# Patient Record
Sex: FEMALE | Race: BLACK OR AFRICAN AMERICAN | NOT HISPANIC OR LATINO | URBAN - METROPOLITAN AREA
[De-identification: names, ages, dates, MRNs, and addresses within clinical notes are randomized per-mention and may not be internally consistent; named-entity substitution may affect disease eponyms.]

---

## 2017-11-16 ENCOUNTER — INPATIENT (INPATIENT)
Facility: HOSPITAL | Age: 77
LOS: 2 days | Discharge: HOME | End: 2017-11-19
Attending: SURGERY | Admitting: SURGERY
Payer: MEDICARE

## 2017-11-16 VITALS
DIASTOLIC BLOOD PRESSURE: 108 MMHG | HEART RATE: 108 BPM | OXYGEN SATURATION: 100 % | TEMPERATURE: 99 F | RESPIRATION RATE: 18 BRPM | SYSTOLIC BLOOD PRESSURE: 170 MMHG

## 2017-11-16 LAB
BASE EXCESS BLDV CALC-SCNC: 4.4 MMOL/L — SIGNIFICANT CHANGE UP
BLOOD GAS VENOUS - CREATININE: 0.63 MG/DL — SIGNIFICANT CHANGE UP (ref 0.5–1.3)
CHLORIDE BLDV-SCNC: 106 MMOL/L — SIGNIFICANT CHANGE UP (ref 96–108)
GAS PNL BLDV: 135 MMOL/L — LOW (ref 136–146)
GLUCOSE BLDV-MCNC: 121 — HIGH (ref 70–99)
HCO3 BLDV-SCNC: 27 MMOL/L — SIGNIFICANT CHANGE UP (ref 20–27)
HCT VFR BLDV CALC: 44.6 % — SIGNIFICANT CHANGE UP (ref 34.5–45)
HGB BLDV-MCNC: 14.6 G/DL — SIGNIFICANT CHANGE UP (ref 11.5–15.5)
LACTATE BLDV-MCNC: 1.8 MMOL/L — SIGNIFICANT CHANGE UP (ref 0.5–2)
PCO2 BLDV: 47 MMHG — SIGNIFICANT CHANGE UP (ref 41–51)
PH BLDV: 7.4 PH — SIGNIFICANT CHANGE UP (ref 7.32–7.43)
PO2 BLDV: 35 MMHG — SIGNIFICANT CHANGE UP (ref 35–40)
POTASSIUM BLDV-SCNC: 4.7 MMOL/L — HIGH (ref 3.4–4.5)
SAO2 % BLDV: 64.4 % — SIGNIFICANT CHANGE UP (ref 60–85)

## 2017-11-16 NOTE — ED PROVIDER NOTE - PHYSICAL EXAMINATION
GENERAL: elderly female in no acute distress.   HEAD:  Atraumatic, Normocephalic  EYES: EOMI, PERRLA  NECK: Supple, No JVD  CHEST/LUNG: Clear to auscultation bilaterally; No wheeze  HEART: Regular rate and rhythm; No murmurs, rubs, or gallops  ABDOMEN: (+)BS, epigastric and RLQ at surgical site tenderness with deep palpation, no rebound or rigidity.   EXTREMITIES:  2+ Peripheral Pulses, No clubbing, cyanosis, or edema  PSYCH: AAOx3  NEUROLOGY: non-focal. 5/5 extremity and  strength.   SKIN: No rashes or lesions

## 2017-11-16 NOTE — ED PROVIDER NOTE - MEDICAL DECISION MAKING DETAILS
76 y/o F hx of HTN, diverticulitis s/p colectomy and reversal, GERD presents with atypical chest pain and abdominal pain. Labs, IVF, Trend CE. Initial EKG not worrisome for ACS. CT abd/pelvis for new pain at surgical site.

## 2017-11-16 NOTE — ED ADULT NURSE NOTE - CHIEF COMPLAINT QUOTE
Pt c/o abd pain with N/V since 1600. PMH of diverticulitis with hemorrhage, colectomy with reversal. LBM tonight at 2000. Denies diarrhea. Also c/o pain in the chest that she thinks is secondary to gas. Pt appears in NAD at this time. EKG in progress. No other PMH noted.

## 2017-11-16 NOTE — ED ADULT NURSE NOTE - CHPI ED SYMPTOMS NEG
no burning urination/no chills/no fever/no blood in stool/no hematuria/no dysuria/no abdominal distension/no diarrhea

## 2017-11-16 NOTE — ED PROVIDER NOTE - OBJECTIVE STATEMENT
76 y/o F hx of HTN, diverticulitis s/p colectomy and reversal, GERD presents with chest pain, N/V since 4 pm. Pt states started experiencing s 76 y/o F hx of HTN, diverticulitis s/p colectomy and reversal, GERD presents with chest pain, N/V since 4 pm. Pt states started experiencing sharp, non-radiating, non-exertional chest pain associated with N/V and subsequent epigastric discomfort. Denies any cardiac hx, has cardiologist for surgical clearance, denies hx of stress test or catheterization. Having normal BM, hx of GERD remote EGD. No recent hematochezia, melena, diarrhea, fever or chills.

## 2017-11-16 NOTE — ED PROVIDER NOTE - ATTENDING CONTRIBUTION TO CARE
78yo F PMH: HTN, diverticulitis s/p colectomy and reversal, GERD presents with epigastric abdominal pain, N/V since 4 pm. Patient currently denies any pain, no prior similar pain, has not had any pain since sx in 2013  On exam awake & alert, NAD., lungs CTAB no wheeze no crackle, RRR, abdomen soft RLQ tenderness no rebound no guarding, 2+ pulses b/l, neuro A&Ox3, skin warm and dry no rash

## 2017-11-16 NOTE — ED PROVIDER NOTE - PMH
Diverticulitis of colon with hemorrhage    GERD (gastroesophageal reflux disease)    HTN (hypertension)    Osteoporosis

## 2017-11-16 NOTE — ED ADULT NURSE NOTE - OBJECTIVE STATEMENT
Patient is a 77 years old creole speaking female who presents with complaints of epigastric /mid upper abdominal pain that started around 4:30 pm,  had a last meal at 0230 pm, accompanied with large amount of clear vomiting . Denies diarrhea, fever, chills. Patient has history of diverticulosis and as per family, has extensive GI surgeries. Patient is alert and oriented x 4, respirations are even and unlabored, S1, S2, regular, abdomen is soft and nontender. 20 gauge saline lock inserted on right basilic vein, blood drawn and sent. will follow up and monitor.

## 2017-11-17 DIAGNOSIS — K56.609 UNSPECIFIED INTESTINAL OBSTRUCTION, UNSPECIFIED AS TO PARTIAL VERSUS COMPLETE OBSTRUCTION: ICD-10-CM

## 2017-11-17 DIAGNOSIS — K43.5 PARASTOMAL HERNIA WITHOUT OBSTRUCTION OR GANGRENE: Chronic | ICD-10-CM

## 2017-11-17 LAB
ALBUMIN SERPL ELPH-MCNC: 4.5 G/DL — SIGNIFICANT CHANGE UP (ref 3.3–5)
ALP SERPL-CCNC: 100 U/L — SIGNIFICANT CHANGE UP (ref 40–120)
ALT FLD-CCNC: 13 U/L — SIGNIFICANT CHANGE UP (ref 4–33)
AST SERPL-CCNC: 20 U/L — SIGNIFICANT CHANGE UP (ref 4–32)
BASOPHILS # BLD AUTO: 0.03 K/UL — SIGNIFICANT CHANGE UP (ref 0–0.2)
BASOPHILS NFR BLD AUTO: 0.4 % — SIGNIFICANT CHANGE UP (ref 0–2)
BILIRUB SERPL-MCNC: 0.6 MG/DL — SIGNIFICANT CHANGE UP (ref 0.2–1.2)
BLD GP AB SCN SERPL QL: NEGATIVE — SIGNIFICANT CHANGE UP
BUN SERPL-MCNC: 11 MG/DL — SIGNIFICANT CHANGE UP (ref 7–23)
BUN SERPL-MCNC: 6 MG/DL — LOW (ref 7–23)
CALCIUM SERPL-MCNC: 9.2 MG/DL — SIGNIFICANT CHANGE UP (ref 8.4–10.5)
CALCIUM SERPL-MCNC: 9.7 MG/DL — SIGNIFICANT CHANGE UP (ref 8.4–10.5)
CHLORIDE SERPL-SCNC: 100 MMOL/L — SIGNIFICANT CHANGE UP (ref 98–107)
CHLORIDE SERPL-SCNC: 105 MMOL/L — SIGNIFICANT CHANGE UP (ref 98–107)
CK MB BLD-MCNC: 1.53 NG/ML — SIGNIFICANT CHANGE UP (ref 1–4.7)
CK MB BLD-MCNC: 3.41 NG/ML — SIGNIFICANT CHANGE UP (ref 1–4.7)
CK MB BLD-MCNC: SIGNIFICANT CHANGE UP (ref 0–2.5)
CK SERPL-CCNC: 111 U/L — SIGNIFICANT CHANGE UP (ref 25–170)
CK SERPL-CCNC: 41 U/L — SIGNIFICANT CHANGE UP (ref 25–170)
CO2 SERPL-SCNC: 23 MMOL/L — SIGNIFICANT CHANGE UP (ref 22–31)
CO2 SERPL-SCNC: 26 MMOL/L — SIGNIFICANT CHANGE UP (ref 22–31)
CREAT SERPL-MCNC: 0.65 MG/DL — SIGNIFICANT CHANGE UP (ref 0.5–1.3)
CREAT SERPL-MCNC: 0.73 MG/DL — SIGNIFICANT CHANGE UP (ref 0.5–1.3)
EOSINOPHIL # BLD AUTO: 0.02 K/UL — SIGNIFICANT CHANGE UP (ref 0–0.5)
EOSINOPHIL NFR BLD AUTO: 0.2 % — SIGNIFICANT CHANGE UP (ref 0–6)
GLUCOSE SERPL-MCNC: 117 MG/DL — HIGH (ref 70–99)
GLUCOSE SERPL-MCNC: 89 MG/DL — SIGNIFICANT CHANGE UP (ref 70–99)
HCT VFR BLD CALC: 40.5 % — SIGNIFICANT CHANGE UP (ref 34.5–45)
HCT VFR BLD CALC: 44.4 % — SIGNIFICANT CHANGE UP (ref 34.5–45)
HGB BLD-MCNC: 12.8 G/DL — SIGNIFICANT CHANGE UP (ref 11.5–15.5)
HGB BLD-MCNC: 14 G/DL — SIGNIFICANT CHANGE UP (ref 11.5–15.5)
IMM GRANULOCYTES # BLD AUTO: 0.03 # — SIGNIFICANT CHANGE UP
IMM GRANULOCYTES NFR BLD AUTO: 0.4 % — SIGNIFICANT CHANGE UP (ref 0–1.5)
LIDOCAIN IGE QN: 40.1 U/L — SIGNIFICANT CHANGE UP (ref 7–60)
LYMPHOCYTES # BLD AUTO: 0.94 K/UL — LOW (ref 1–3.3)
LYMPHOCYTES # BLD AUTO: 11.2 % — LOW (ref 13–44)
MAGNESIUM SERPL-MCNC: 1.9 MG/DL — SIGNIFICANT CHANGE UP (ref 1.6–2.6)
MAGNESIUM SERPL-MCNC: 2.1 MG/DL — SIGNIFICANT CHANGE UP (ref 1.6–2.6)
MCHC RBC-ENTMCNC: 25 PG — LOW (ref 27–34)
MCHC RBC-ENTMCNC: 25.2 PG — LOW (ref 27–34)
MCHC RBC-ENTMCNC: 31.5 % — LOW (ref 32–36)
MCHC RBC-ENTMCNC: 31.6 % — LOW (ref 32–36)
MCV RBC AUTO: 79.1 FL — LOW (ref 80–100)
MCV RBC AUTO: 79.7 FL — LOW (ref 80–100)
MONOCYTES # BLD AUTO: 0.37 K/UL — SIGNIFICANT CHANGE UP (ref 0–0.9)
MONOCYTES NFR BLD AUTO: 4.4 % — SIGNIFICANT CHANGE UP (ref 2–14)
NEUTROPHILS # BLD AUTO: 7 K/UL — SIGNIFICANT CHANGE UP (ref 1.8–7.4)
NEUTROPHILS NFR BLD AUTO: 83.4 % — HIGH (ref 43–77)
NRBC # FLD: 0 — SIGNIFICANT CHANGE UP
NRBC # FLD: 0 — SIGNIFICANT CHANGE UP
PHOSPHATE SERPL-MCNC: 2.8 MG/DL — SIGNIFICANT CHANGE UP (ref 2.5–4.5)
PHOSPHATE SERPL-MCNC: 2.9 MG/DL — SIGNIFICANT CHANGE UP (ref 2.5–4.5)
PLATELET # BLD AUTO: 255 K/UL — SIGNIFICANT CHANGE UP (ref 150–400)
PLATELET # BLD AUTO: 317 K/UL — SIGNIFICANT CHANGE UP (ref 150–400)
PMV BLD: 10.4 FL — SIGNIFICANT CHANGE UP (ref 7–13)
PMV BLD: 9.9 FL — SIGNIFICANT CHANGE UP (ref 7–13)
POTASSIUM SERPL-MCNC: 3.8 MMOL/L — SIGNIFICANT CHANGE UP (ref 3.5–5.3)
POTASSIUM SERPL-MCNC: 4.4 MMOL/L — SIGNIFICANT CHANGE UP (ref 3.5–5.3)
POTASSIUM SERPL-SCNC: 3.8 MMOL/L — SIGNIFICANT CHANGE UP (ref 3.5–5.3)
POTASSIUM SERPL-SCNC: 4.4 MMOL/L — SIGNIFICANT CHANGE UP (ref 3.5–5.3)
PROT SERPL-MCNC: 9.2 G/DL — HIGH (ref 6–8.3)
RBC # BLD: 5.08 M/UL — SIGNIFICANT CHANGE UP (ref 3.8–5.2)
RBC # BLD: 5.61 M/UL — HIGH (ref 3.8–5.2)
RBC # FLD: 14.2 % — SIGNIFICANT CHANGE UP (ref 10.3–14.5)
RBC # FLD: 14.5 % — SIGNIFICANT CHANGE UP (ref 10.3–14.5)
RH IG SCN BLD-IMP: POSITIVE — SIGNIFICANT CHANGE UP
SODIUM SERPL-SCNC: 140 MMOL/L — SIGNIFICANT CHANGE UP (ref 135–145)
SODIUM SERPL-SCNC: 141 MMOL/L — SIGNIFICANT CHANGE UP (ref 135–145)
TROPONIN T SERPL-MCNC: < 0.06 NG/ML — SIGNIFICANT CHANGE UP (ref 0–0.06)
TROPONIN T SERPL-MCNC: < 0.06 NG/ML — SIGNIFICANT CHANGE UP (ref 0–0.06)
WBC # BLD: 6.11 K/UL — SIGNIFICANT CHANGE UP (ref 3.8–10.5)
WBC # BLD: 8.39 K/UL — SIGNIFICANT CHANGE UP (ref 3.8–10.5)
WBC # FLD AUTO: 6.11 K/UL — SIGNIFICANT CHANGE UP (ref 3.8–10.5)
WBC # FLD AUTO: 8.39 K/UL — SIGNIFICANT CHANGE UP (ref 3.8–10.5)

## 2017-11-17 PROCEDURE — 71010: CPT | Mod: 26

## 2017-11-17 PROCEDURE — 74177 CT ABD & PELVIS W/CONTRAST: CPT | Mod: 26

## 2017-11-17 RX ORDER — SODIUM CHLORIDE 9 MG/ML
1000 INJECTION INTRAMUSCULAR; INTRAVENOUS; SUBCUTANEOUS ONCE
Qty: 0 | Refills: 0 | Status: COMPLETED | OUTPATIENT
Start: 2017-11-17 | End: 2017-11-17

## 2017-11-17 RX ORDER — SODIUM CHLORIDE 9 MG/ML
1000 INJECTION, SOLUTION INTRAVENOUS
Qty: 0 | Refills: 0 | Status: DISCONTINUED | OUTPATIENT
Start: 2017-11-17 | End: 2017-11-18

## 2017-11-17 RX ORDER — HYDRALAZINE HCL 50 MG
10 TABLET ORAL ONCE
Qty: 0 | Refills: 0 | Status: COMPLETED | OUTPATIENT
Start: 2017-11-17 | End: 2017-11-17

## 2017-11-17 RX ORDER — LOSARTAN POTASSIUM 100 MG/1
50 TABLET, FILM COATED ORAL DAILY
Qty: 0 | Refills: 0 | Status: DISCONTINUED | OUTPATIENT
Start: 2017-11-17 | End: 2017-11-17

## 2017-11-17 RX ORDER — ENOXAPARIN SODIUM 100 MG/ML
40 INJECTION SUBCUTANEOUS EVERY 24 HOURS
Qty: 0 | Refills: 0 | Status: DISCONTINUED | OUTPATIENT
Start: 2017-11-17 | End: 2017-11-19

## 2017-11-17 RX ORDER — PANTOPRAZOLE SODIUM 20 MG/1
40 TABLET, DELAYED RELEASE ORAL ONCE
Qty: 0 | Refills: 0 | Status: COMPLETED | OUTPATIENT
Start: 2017-11-17 | End: 2017-11-17

## 2017-11-17 RX ORDER — ACETAMINOPHEN 500 MG
1000 TABLET ORAL ONCE
Qty: 0 | Refills: 0 | Status: COMPLETED | OUTPATIENT
Start: 2017-11-17 | End: 2017-11-17

## 2017-11-17 RX ORDER — ASPIRIN/CALCIUM CARB/MAGNESIUM 324 MG
162 TABLET ORAL ONCE
Qty: 0 | Refills: 0 | Status: COMPLETED | OUTPATIENT
Start: 2017-11-17 | End: 2017-11-17

## 2017-11-17 RX ORDER — PREDNISOLONE SODIUM PHOSPHATE 1 %
1 DROPS OPHTHALMIC (EYE) DAILY
Qty: 0 | Refills: 0 | Status: DISCONTINUED | OUTPATIENT
Start: 2017-11-17 | End: 2017-11-19

## 2017-11-17 RX ORDER — HYDRALAZINE HCL 50 MG
10 TABLET ORAL ONCE
Qty: 0 | Refills: 0 | Status: DISCONTINUED | OUTPATIENT
Start: 2017-11-17 | End: 2017-11-17

## 2017-11-17 RX ORDER — ASPIRIN/CALCIUM CARB/MAGNESIUM 324 MG
162 TABLET ORAL ONCE
Qty: 0 | Refills: 0 | Status: DISCONTINUED | OUTPATIENT
Start: 2017-11-17 | End: 2017-11-17

## 2017-11-17 RX ADMIN — PANTOPRAZOLE SODIUM 40 MILLIGRAM(S): 20 TABLET, DELAYED RELEASE ORAL at 05:51

## 2017-11-17 RX ADMIN — Medication 10 MILLIGRAM(S): at 03:46

## 2017-11-17 RX ADMIN — SODIUM CHLORIDE 2000 MILLILITER(S): 9 INJECTION INTRAMUSCULAR; INTRAVENOUS; SUBCUTANEOUS at 05:24

## 2017-11-17 RX ADMIN — ENOXAPARIN SODIUM 40 MILLIGRAM(S): 100 INJECTION SUBCUTANEOUS at 12:42

## 2017-11-17 RX ADMIN — SODIUM CHLORIDE 125 MILLILITER(S): 9 INJECTION, SOLUTION INTRAVENOUS at 05:54

## 2017-11-17 RX ADMIN — Medication 400 MILLIGRAM(S): at 23:48

## 2017-11-17 RX ADMIN — SODIUM CHLORIDE 2000 MILLILITER(S): 9 INJECTION INTRAMUSCULAR; INTRAVENOUS; SUBCUTANEOUS at 00:32

## 2017-11-17 RX ADMIN — SODIUM CHLORIDE 125 MILLILITER(S): 9 INJECTION, SOLUTION INTRAVENOUS at 23:50

## 2017-11-17 RX ADMIN — Medication 162 MILLIGRAM(S): at 01:10

## 2017-11-17 RX ADMIN — SODIUM CHLORIDE 125 MILLILITER(S): 9 INJECTION, SOLUTION INTRAVENOUS at 12:42

## 2017-11-17 NOTE — H&P ADULT - HISTORY OF PRESENT ILLNESS
78 yo Creole speaking female with one day history of abdominal pain. She has a history of lower GI bleed s/p subtotal colectomy with end ileostomy  that was subsequently reversed and parastomal hernia repair for incarceration. When she had her hernia repaired she had similar symptoms to this. She notes nausea and vomiting for one day. She last had a bowel movement yesterday and well as flatus. She denies fevers or chills. She is an otherwise healthy 77 year old female.

## 2017-11-17 NOTE — H&P ADULT - NSHPPHYSICALEXAM_GEN_ALL_CORE
Gen: NAD  HEENT: no scleral injection, EOMI, no neck masses  CV: S1/S2  Resp: clear to auscultation bilaterally  Abdomen: soft, minimally distended, non-tender  Ext: warm well perfused

## 2017-11-17 NOTE — ED ADULT NURSE REASSESSMENT NOTE - NS ED NURSE REASSESS COMMENT FT1
Pt BP stable at this time, 2 additional IV's placed one to right AC and one to left hand, additional labs drawn and sent, EKG completed, pt placed on cardiac monitor, NG tube in place draining normally. Awaiting xray for positive placement. Will continue to monitor.

## 2017-11-17 NOTE — ED ADULT NURSE REASSESSMENT NOTE - NS ED NURSE REASSESS COMMENT FT1
Pt appears diaphoretic, states she feels dizzy, BP dropped, MD Sanches aware of BP change, 1LNS administered, EKG in process, Will continue to monitor.

## 2017-11-17 NOTE — PROVIDER CONTACT NOTE (OTHER) - ASSESSMENT
Pt has /101. Pt has been running high and is asymptomatic. Pt c/o pain from NG tube and thinks pain medication will help lower BP.

## 2017-11-17 NOTE — H&P ADULT - ASSESSMENT
78 yo female with SBO  -NPO  -IV fluids  -NGT decompression  -serial abdominal exams  -Discussed with Dr. Waldrop

## 2017-11-17 NOTE — PATIENT PROFILE ADULT. - LANGUAGE ASSISTANCE NEEDED
Yes-Patient/Caregiver accepts free interpretation services.../pt at bedside, use interpretation when family is not here

## 2017-11-18 LAB
BUN SERPL-MCNC: 6 MG/DL — LOW (ref 7–23)
CALCIUM SERPL-MCNC: 8.6 MG/DL — SIGNIFICANT CHANGE UP (ref 8.4–10.5)
CHLORIDE SERPL-SCNC: 102 MMOL/L — SIGNIFICANT CHANGE UP (ref 98–107)
CO2 SERPL-SCNC: 25 MMOL/L — SIGNIFICANT CHANGE UP (ref 22–31)
CREAT SERPL-MCNC: 0.66 MG/DL — SIGNIFICANT CHANGE UP (ref 0.5–1.3)
GLUCOSE SERPL-MCNC: 79 MG/DL — SIGNIFICANT CHANGE UP (ref 70–99)
HCT VFR BLD CALC: 38 % — SIGNIFICANT CHANGE UP (ref 34.5–45)
HGB BLD-MCNC: 11.9 G/DL — SIGNIFICANT CHANGE UP (ref 11.5–15.5)
MAGNESIUM SERPL-MCNC: 1.8 MG/DL — SIGNIFICANT CHANGE UP (ref 1.6–2.6)
MCHC RBC-ENTMCNC: 24.8 PG — LOW (ref 27–34)
MCHC RBC-ENTMCNC: 31.3 % — LOW (ref 32–36)
MCV RBC AUTO: 79.3 FL — LOW (ref 80–100)
NRBC # FLD: 0 — SIGNIFICANT CHANGE UP
PHOSPHATE SERPL-MCNC: 2.9 MG/DL — SIGNIFICANT CHANGE UP (ref 2.5–4.5)
PLATELET # BLD AUTO: 264 K/UL — SIGNIFICANT CHANGE UP (ref 150–400)
PMV BLD: 10.7 FL — SIGNIFICANT CHANGE UP (ref 7–13)
POTASSIUM SERPL-MCNC: 3.8 MMOL/L — SIGNIFICANT CHANGE UP (ref 3.5–5.3)
POTASSIUM SERPL-SCNC: 3.8 MMOL/L — SIGNIFICANT CHANGE UP (ref 3.5–5.3)
RBC # BLD: 4.79 M/UL — SIGNIFICANT CHANGE UP (ref 3.8–5.2)
RBC # FLD: 14.2 % — SIGNIFICANT CHANGE UP (ref 10.3–14.5)
SODIUM SERPL-SCNC: 139 MMOL/L — SIGNIFICANT CHANGE UP (ref 135–145)
WBC # BLD: 4.95 K/UL — SIGNIFICANT CHANGE UP (ref 3.8–10.5)
WBC # FLD AUTO: 4.95 K/UL — SIGNIFICANT CHANGE UP (ref 3.8–10.5)

## 2017-11-18 PROCEDURE — 71010: CPT | Mod: 26

## 2017-11-18 RX ORDER — SODIUM CHLORIDE 9 MG/ML
1000 INJECTION, SOLUTION INTRAVENOUS
Qty: 0 | Refills: 0 | Status: DISCONTINUED | OUTPATIENT
Start: 2017-11-18 | End: 2017-11-18

## 2017-11-18 RX ORDER — SODIUM CHLORIDE 9 MG/ML
1000 INJECTION, SOLUTION INTRAVENOUS
Qty: 0 | Refills: 0 | Status: DISCONTINUED | OUTPATIENT
Start: 2017-11-18 | End: 2017-11-19

## 2017-11-18 RX ADMIN — Medication 1000 MILLIGRAM(S): at 00:19

## 2017-11-18 RX ADMIN — ENOXAPARIN SODIUM 40 MILLIGRAM(S): 100 INJECTION SUBCUTANEOUS at 11:21

## 2017-11-18 RX ADMIN — Medication 1 DROP(S): at 11:21

## 2017-11-18 RX ADMIN — SODIUM CHLORIDE 100 MILLILITER(S): 9 INJECTION, SOLUTION INTRAVENOUS at 23:15

## 2017-11-18 RX ADMIN — Medication 10 MILLIGRAM(S): at 00:24

## 2017-11-18 RX ADMIN — SODIUM CHLORIDE 125 MILLILITER(S): 9 INJECTION, SOLUTION INTRAVENOUS at 11:22

## 2017-11-18 NOTE — PROGRESS NOTE ADULT - SUBJECTIVE AND OBJECTIVE BOX
B Team Surgery Progress Note - pager [phone]    Patient is a 77y old  Female who presents with a chief complaint of abdominal pain (17 Nov 2017 05:06)      SUBJECTIVE: Patient seen and examined on B team morning rounds. No acute events overnight. NGT clamped this morning, has had low output. Patient denies nausea. Admits to passing gas and having BMs.      OBJECTIVE:     ** Physical Exam **  Gen: Alert, NAD  Abdomen: soft, minimally distended, non-tender    ** Vital signs / I&O's **    Vital Signs Last 24 Hrs  T(C): 36.7 (18 Nov 2017 06:08), Max: 37.6 (17 Nov 2017 18:47)  T(F): 98 (18 Nov 2017 06:08), Max: 99.7 (17 Nov 2017 18:47)  HR: 90 (18 Nov 2017 06:08) (87 - 101)  BP: 133/82 (18 Nov 2017 06:08) (128/78 - 172/102)  BP(mean): --  RR: 18 (18 Nov 2017 06:08) (18 - 18)  SpO2: 100% (18 Nov 2017 06:08) (98% - 100%)      17 Nov 2017 07:01  -  18 Nov 2017 07:00  --------------------------------------------------------  IN:    IV PiggyBack: 100 mL    lactated ringers.: 2875 mL  Total IN: 2975 mL    OUT:    Nasoenteral Tube: 150 mL    Voided: 2500 mL  Total OUT: 2650 mL    Total NET: 325 mL            ** Labs **                          11.9   4.95  )-----------( 264      ( 18 Nov 2017 06:55 )             38.0     18 Nov 2017 06:55    139    |  102    |  6      ----------------------------<  79     3.8     |  25     |  0.66     Ca    8.6        18 Nov 2017 06:55  Phos  2.9       18 Nov 2017 06:55  Mg     1.8       18 Nov 2017 06:55    TPro  9.2    /  Alb  4.5    /  TBili  0.6    /  DBili  x      /  AST  20     /  ALT  13     /  AlkPhos  100    16 Nov 2017 23:30      CAPILLARY BLOOD GLUCOSE        CARDIAC MARKERS ( 17 Nov 2017 05:17 )  x     / < 0.06 ng/mL / 41 u/L / 1.53 ng/mL / x      CARDIAC MARKERS ( 16 Nov 2017 23:30 )  x     / < 0.06 ng/mL / 111 u/L / 3.41 ng/mL / x          LIVER FUNCTIONS - ( 16 Nov 2017 23:30 )  Alb: 4.5 g/dL / Pro: 9.2 g/dL / ALK PHOS: 100 u/L / ALT: 13 u/L / AST: 20 u/L / GGT: x               MEDICATIONS  (STANDING):  enoxaparin Injectable 40 milliGRAM(s) SubCutaneous every 24 hours  lactated ringers. 1000 milliLiter(s) (125 mL/Hr) IV Continuous <Continuous>  prednisoLONE acetate 1% Suspension 1 Drop(s) Both EYES daily    MEDICATIONS  (PRN):

## 2017-11-18 NOTE — PROGRESS NOTE ADULT - ASSESSMENT
78 yo female with SBO, medically managed.    Plan:  - NPO with IV fluids  - NGT clamped, likely remove today  - Serial abdominal exams  - DVT PPx

## 2017-11-18 NOTE — PROGRESS NOTE ADULT - ATTENDING COMMENTS
I have personally interviewed and examined this patient, reviewed pertinent labs and imaging, and discussed the case with colleagues, residents, and physician assistants on B Team rounds.    The active care issues are:  1. at risk for malnutrition  2. small bowel obstruction    Reports passing flatus. NGT currently clamped.    Exam  NAD  abdomen soft, nontender  extremities warm    Plan  NGT clamp trial - if tolerates can dc NGT  OOB, ambulate   if does not tolerate clamp trial, obtain KUB to evaluate bowel gas pattern

## 2017-11-19 ENCOUNTER — TRANSCRIPTION ENCOUNTER (OUTPATIENT)
Age: 77
End: 2017-11-19

## 2017-11-19 VITALS
HEART RATE: 99 BPM | SYSTOLIC BLOOD PRESSURE: 148 MMHG | DIASTOLIC BLOOD PRESSURE: 98 MMHG | TEMPERATURE: 98 F | RESPIRATION RATE: 18 BRPM

## 2017-11-19 LAB
BUN SERPL-MCNC: 6 MG/DL — LOW (ref 7–23)
CALCIUM SERPL-MCNC: 8.9 MG/DL — SIGNIFICANT CHANGE UP (ref 8.4–10.5)
CHLORIDE SERPL-SCNC: 102 MMOL/L — SIGNIFICANT CHANGE UP (ref 98–107)
CO2 SERPL-SCNC: 24 MMOL/L — SIGNIFICANT CHANGE UP (ref 22–31)
CREAT SERPL-MCNC: 0.63 MG/DL — SIGNIFICANT CHANGE UP (ref 0.5–1.3)
GLUCOSE SERPL-MCNC: 104 MG/DL — HIGH (ref 70–99)
HCT VFR BLD CALC: 35.5 % — SIGNIFICANT CHANGE UP (ref 34.5–45)
HGB BLD-MCNC: 11.7 G/DL — SIGNIFICANT CHANGE UP (ref 11.5–15.5)
MAGNESIUM SERPL-MCNC: 1.8 MG/DL — SIGNIFICANT CHANGE UP (ref 1.6–2.6)
MCHC RBC-ENTMCNC: 25.9 PG — LOW (ref 27–34)
MCHC RBC-ENTMCNC: 33 % — SIGNIFICANT CHANGE UP (ref 32–36)
MCV RBC AUTO: 78.7 FL — LOW (ref 80–100)
NRBC # FLD: 0 — SIGNIFICANT CHANGE UP
PHOSPHATE SERPL-MCNC: 3 MG/DL — SIGNIFICANT CHANGE UP (ref 2.5–4.5)
PLATELET # BLD AUTO: 235 K/UL — SIGNIFICANT CHANGE UP (ref 150–400)
PMV BLD: 10.5 FL — SIGNIFICANT CHANGE UP (ref 7–13)
POTASSIUM SERPL-MCNC: 3.1 MMOL/L — LOW (ref 3.5–5.3)
POTASSIUM SERPL-SCNC: 3.1 MMOL/L — LOW (ref 3.5–5.3)
RBC # BLD: 4.51 M/UL — SIGNIFICANT CHANGE UP (ref 3.8–5.2)
RBC # FLD: 14.5 % — SIGNIFICANT CHANGE UP (ref 10.3–14.5)
SODIUM SERPL-SCNC: 141 MMOL/L — SIGNIFICANT CHANGE UP (ref 135–145)
WBC # BLD: 4.68 K/UL — SIGNIFICANT CHANGE UP (ref 3.8–10.5)
WBC # FLD AUTO: 4.68 K/UL — SIGNIFICANT CHANGE UP (ref 3.8–10.5)

## 2017-11-19 NOTE — PROGRESS NOTE ADULT - ASSESSMENT
78 yo female with SBO, medically managed that has now resolved:  - Reg diet  - Pain control  - DVT PPx  - Dispo planning

## 2017-11-19 NOTE — PROGRESS NOTE ADULT - SUBJECTIVE AND OBJECTIVE BOX
B Team Surgery Progress Note - pager [phone]    SUBJECTIVE: Patient seen and examined on B team morning rounds. No acute events overnight. Tolerating diet. Pain controlled. +/+ GI function.      OBJECTIVE:     ** Physical Exam **  Gen: Alert, NAD  Resp: Non-labored  Cards: RRR  Abd: Soft, NT/ND. No rebound/guarding    ** Vital signs / I&O's **    Vital Signs Last 24 Hrs  T(C): 36.6 (19 Nov 2017 10:02), Max: 36.9 (18 Nov 2017 14:19)  T(F): 97.8 (19 Nov 2017 10:02), Max: 98.5 (18 Nov 2017 17:36)  HR: 99 (19 Nov 2017 10:02) (88 - 99)  BP: 148/98 (19 Nov 2017 10:02) (144/93 - 155/99)  BP(mean): --  RR: 18 (19 Nov 2017 10:02) (16 - 18)  SpO2: 98% (19 Nov 2017 05:24) (98% - 99%)      18 Nov 2017 07:01  -  19 Nov 2017 07:00  --------------------------------------------------------  IN:    dextrose 5% + sodium chloride 0.3%.: 800 mL    lactated ringers.: 1500 mL    Oral Fluid: 340 mL  Total IN: 2640 mL    OUT:    Voided: 2100 mL  Total OUT: 2100 mL    Total NET: 540 mL            ** Labs **                          11.7   4.68  )-----------( 235      ( 19 Nov 2017 06:45 )             35.5     19 Nov 2017 06:45    141    |  102    |  6      ----------------------------<  104    3.1     |  24     |  0.63     Ca    8.9        19 Nov 2017 06:45  Phos  3.0       19 Nov 2017 06:45  Mg     1.8       19 Nov 2017 06:45        CAPILLARY BLOOD GLUCOSE                  MEDICATIONS  (STANDING):  dextrose 5% + sodium chloride 0.3%. 1000 milliLiter(s) (100 mL/Hr) IV Continuous <Continuous>  enoxaparin Injectable 40 milliGRAM(s) SubCutaneous every 24 hours  prednisoLONE acetate 1% Suspension 1 Drop(s) Both EYES daily    MEDICATIONS  (PRN):

## 2017-11-19 NOTE — DISCHARGE NOTE ADULT - PLAN OF CARE
Resolving of small bowel obstruction Follow-up: Please follow-up with Dr. Waldrop in the next one to two weeks. Call the office for an appointment.  Activity: As tolerated  Diet: Regular

## 2017-11-19 NOTE — DISCHARGE NOTE ADULT - MEDICATION SUMMARY - MEDICATIONS TO TAKE
I will START or STAY ON the medications listed below when I get home from the hospital:    losartan 50 mg oral tablet  -- 1 tab(s) by mouth once a day  -- Indication: For HTN    omeprazole 40 mg oral delayed release capsule  -- 1 cap(s) by mouth once a day  -- Indication: For GERD

## 2017-11-19 NOTE — DISCHARGE NOTE ADULT - CARE PROVIDER_API CALL
Michelet Waldrop), Surgery; Surgical Critical Care  [address] [place] [address]  Phone: [phone]  Fax: [phone]

## 2017-11-19 NOTE — DISCHARGE NOTE ADULT - HOSPITAL COURSE
76 yo Creole speaking female with one day history of abdominal pain. She has a history of lower GI bleed s/p subtotal colectomy with end ileostomy  that was subsequently reversed and parastomal hernia repair for incarceration. When she had her hernia repaired she had similar symptoms to this. She notes nausea and vomiting for one day. She last had a bowel movement two days ago as well as passed flatus. A CT scan was done that showed "Small bowel obstruction with single transition point in the mid to distal jejunum, located adjacent to the ventral abdominal wall in the periumbilical region. There is no evidence of gastrointestinal perforation, abscess/drainable fluid collection or secondary CT signs of bowel ischemia." A NG tube was placed and IVF were started for hydration. After first day of hospital stay, patient started having GI function. NG tube was clamped and the patient was not nauseous or had emesis. At the time of discharge, the patient was hemodynamically stable, was tolerating PO diet, was voiding urine and passing stool, was ambulating, and was comfortable with adequate pain control. The patient was instructed to follow up with Dr. Waldrop within 1-2 weeks after discharge from the hospital. The patient/family felt comfortable with discharge. The patient was discharged to home.

## 2017-11-19 NOTE — PROGRESS NOTE ADULT - ATTENDING COMMENTS
I have personally interviewed and examined this patient, reviewed pertinent labs, and discussed the case with colleagues and residents on B Team rounds.    wants to go home  abd soft, NT/ND    The active care issues are:  1. resolved adhesive SBO    Oral diet challenge and discharge home if tolerates.

## 2017-11-19 NOTE — DISCHARGE NOTE ADULT - CARE PLAN
Principal Discharge DX:	Small bowel obstruction  Goal:	Resolving of small bowel obstruction  Instructions for follow-up, activity and diet:	Follow-up: Please follow-up with Dr. Waldrop in the next one to two weeks. Call the office for an appointment.  Activity: As tolerated  Diet: Regular

## 2017-12-05 ENCOUNTER — APPOINTMENT (OUTPATIENT)
Dept: TRAUMA SURGERY | Facility: CLINIC | Age: 77
End: 2017-12-05
Payer: MEDICARE

## 2017-12-05 VITALS
BODY MASS INDEX: 28.66 KG/M2 | DIASTOLIC BLOOD PRESSURE: 110 MMHG | WEIGHT: 146 LBS | TEMPERATURE: 99.5 F | HEIGHT: 60 IN | HEART RATE: 92 BPM | SYSTOLIC BLOOD PRESSURE: 166 MMHG

## 2017-12-05 PROCEDURE — XXXXX: CPT

## 2020-02-16 NOTE — PATIENT PROFILE ADULT. - FUNCTIONAL SCREEN CURRENT LEVEL: EATING, MLM
(0) independent Normal vision: sees adequately in most situations; can see medication labels, newsprint

## 2022-07-13 ENCOUNTER — NON-APPOINTMENT (OUTPATIENT)
Age: 82
End: 2022-07-13

## 2022-08-19 ENCOUNTER — INPATIENT (INPATIENT)
Facility: HOSPITAL | Age: 82
LOS: 1 days | Discharge: HOME | End: 2022-08-21
Attending: SURGERY | Admitting: SURGERY

## 2022-08-19 VITALS
TEMPERATURE: 98 F | SYSTOLIC BLOOD PRESSURE: 163 MMHG | HEART RATE: 93 BPM | HEIGHT: 62 IN | OXYGEN SATURATION: 100 % | DIASTOLIC BLOOD PRESSURE: 92 MMHG | RESPIRATION RATE: 15 BRPM

## 2022-08-19 DIAGNOSIS — K56.609 UNSPECIFIED INTESTINAL OBSTRUCTION, UNSPECIFIED AS TO PARTIAL VERSUS COMPLETE OBSTRUCTION: ICD-10-CM

## 2022-08-19 DIAGNOSIS — K43.5 PARASTOMAL HERNIA WITHOUT OBSTRUCTION OR GANGRENE: Chronic | ICD-10-CM

## 2022-08-19 LAB
ALBUMIN SERPL ELPH-MCNC: 4.5 G/DL — SIGNIFICANT CHANGE UP (ref 3.3–5)
ALP SERPL-CCNC: 97 U/L — SIGNIFICANT CHANGE UP (ref 40–120)
ALT FLD-CCNC: 14 U/L — SIGNIFICANT CHANGE UP (ref 4–33)
ANION GAP SERPL CALC-SCNC: 14 MMOL/L — SIGNIFICANT CHANGE UP (ref 7–14)
APPEARANCE UR: CLEAR — SIGNIFICANT CHANGE UP
APTT BLD: 29.3 SEC — SIGNIFICANT CHANGE UP (ref 27–36.3)
AST SERPL-CCNC: 22 U/L — SIGNIFICANT CHANGE UP (ref 4–32)
BASE EXCESS BLDV CALC-SCNC: 5 MMOL/L — HIGH (ref -2–3)
BASOPHILS # BLD AUTO: 0.02 K/UL — SIGNIFICANT CHANGE UP (ref 0–0.2)
BASOPHILS NFR BLD AUTO: 0.3 % — SIGNIFICANT CHANGE UP (ref 0–2)
BILIRUB SERPL-MCNC: 0.7 MG/DL — SIGNIFICANT CHANGE UP (ref 0.2–1.2)
BILIRUB UR-MCNC: NEGATIVE — SIGNIFICANT CHANGE UP
BLOOD GAS VENOUS COMPREHENSIVE RESULT: SIGNIFICANT CHANGE UP
BUN SERPL-MCNC: 14 MG/DL — SIGNIFICANT CHANGE UP (ref 7–23)
CALCIUM SERPL-MCNC: 10.3 MG/DL — SIGNIFICANT CHANGE UP (ref 8.4–10.5)
CHLORIDE BLDV-SCNC: 103 MMOL/L — SIGNIFICANT CHANGE UP (ref 96–108)
CHLORIDE SERPL-SCNC: 98 MMOL/L — SIGNIFICANT CHANGE UP (ref 98–107)
CO2 BLDV-SCNC: 32.5 MMOL/L — HIGH (ref 22–26)
CO2 SERPL-SCNC: 24 MMOL/L — SIGNIFICANT CHANGE UP (ref 22–31)
COLOR SPEC: SIGNIFICANT CHANGE UP
CREAT SERPL-MCNC: 0.7 MG/DL — SIGNIFICANT CHANGE UP (ref 0.5–1.3)
DIFF PNL FLD: NEGATIVE — SIGNIFICANT CHANGE UP
EGFR: 87 ML/MIN/1.73M2 — SIGNIFICANT CHANGE UP
EOSINOPHIL # BLD AUTO: 0.07 K/UL — SIGNIFICANT CHANGE UP (ref 0–0.5)
EOSINOPHIL NFR BLD AUTO: 1 % — SIGNIFICANT CHANGE UP (ref 0–6)
FLUAV AG NPH QL: SIGNIFICANT CHANGE UP
FLUBV AG NPH QL: SIGNIFICANT CHANGE UP
GAS PNL BLDV: 136 MMOL/L — SIGNIFICANT CHANGE UP (ref 136–145)
GAS PNL BLDV: SIGNIFICANT CHANGE UP
GLUCOSE BLDV-MCNC: 172 MG/DL — HIGH (ref 70–99)
GLUCOSE SERPL-MCNC: 161 MG/DL — HIGH (ref 70–99)
GLUCOSE UR QL: NEGATIVE — SIGNIFICANT CHANGE UP
HCO3 BLDV-SCNC: 31 MMOL/L — HIGH (ref 22–29)
HCT VFR BLD CALC: 44.6 % — SIGNIFICANT CHANGE UP (ref 34.5–45)
HCT VFR BLDA CALC: 41 % — SIGNIFICANT CHANGE UP (ref 34.5–46.5)
HGB BLD CALC-MCNC: 13.8 G/DL — SIGNIFICANT CHANGE UP (ref 11.5–15.5)
HGB BLD-MCNC: 13.7 G/DL — SIGNIFICANT CHANGE UP (ref 11.5–15.5)
IANC: 6.07 K/UL — SIGNIFICANT CHANGE UP (ref 1.8–7.4)
IMM GRANULOCYTES NFR BLD AUTO: 0.4 % — SIGNIFICANT CHANGE UP (ref 0–1.5)
INR BLD: 1.17 RATIO — HIGH (ref 0.88–1.16)
KETONES UR-MCNC: NEGATIVE — SIGNIFICANT CHANGE UP
LACTATE BLDV-MCNC: 1.6 MMOL/L — SIGNIFICANT CHANGE UP (ref 0.5–2)
LEUKOCYTE ESTERASE UR-ACNC: NEGATIVE — SIGNIFICANT CHANGE UP
LIDOCAIN IGE QN: 40 U/L — SIGNIFICANT CHANGE UP (ref 7–60)
LYMPHOCYTES # BLD AUTO: 0.82 K/UL — LOW (ref 1–3.3)
LYMPHOCYTES # BLD AUTO: 11.2 % — LOW (ref 13–44)
MCHC RBC-ENTMCNC: 25.3 PG — LOW (ref 27–34)
MCHC RBC-ENTMCNC: 30.7 GM/DL — LOW (ref 32–36)
MCV RBC AUTO: 82.3 FL — SIGNIFICANT CHANGE UP (ref 80–100)
MONOCYTES # BLD AUTO: 0.31 K/UL — SIGNIFICANT CHANGE UP (ref 0–0.9)
MONOCYTES NFR BLD AUTO: 4.2 % — SIGNIFICANT CHANGE UP (ref 2–14)
NEUTROPHILS # BLD AUTO: 6.07 K/UL — SIGNIFICANT CHANGE UP (ref 1.8–7.4)
NEUTROPHILS NFR BLD AUTO: 82.9 % — HIGH (ref 43–77)
NITRITE UR-MCNC: NEGATIVE — SIGNIFICANT CHANGE UP
NRBC # BLD: 0 /100 WBCS — SIGNIFICANT CHANGE UP (ref 0–0)
NRBC # FLD: 0 K/UL — SIGNIFICANT CHANGE UP (ref 0–0)
PCO2 BLDV: 50 MMHG — HIGH (ref 39–42)
PH BLDV: 7.4 — SIGNIFICANT CHANGE UP (ref 7.32–7.43)
PH UR: 7.5 — SIGNIFICANT CHANGE UP (ref 5–8)
PLATELET # BLD AUTO: 245 K/UL — SIGNIFICANT CHANGE UP (ref 150–400)
PO2 BLDV: 26 MMHG — SIGNIFICANT CHANGE UP
POTASSIUM BLDV-SCNC: 3.8 MMOL/L — SIGNIFICANT CHANGE UP (ref 3.5–5.1)
POTASSIUM SERPL-MCNC: 4.2 MMOL/L — SIGNIFICANT CHANGE UP (ref 3.5–5.3)
POTASSIUM SERPL-SCNC: 4.2 MMOL/L — SIGNIFICANT CHANGE UP (ref 3.5–5.3)
PROT SERPL-MCNC: 8.9 G/DL — HIGH (ref 6–8.3)
PROT UR-MCNC: ABNORMAL
PROTHROM AB SERPL-ACNC: 13.6 SEC — HIGH (ref 10.5–13.4)
RBC # BLD: 5.42 M/UL — HIGH (ref 3.8–5.2)
RBC # FLD: 15 % — HIGH (ref 10.3–14.5)
RSV RNA NPH QL NAA+NON-PROBE: SIGNIFICANT CHANGE UP
SAO2 % BLDV: 41 % — SIGNIFICANT CHANGE UP
SARS-COV-2 RNA SPEC QL NAA+PROBE: SIGNIFICANT CHANGE UP
SODIUM SERPL-SCNC: 136 MMOL/L — SIGNIFICANT CHANGE UP (ref 135–145)
SP GR SPEC: 1.02 — SIGNIFICANT CHANGE UP (ref 1.01–1.05)
TROPONIN T, HIGH SENSITIVITY RESULT: 7 NG/L — SIGNIFICANT CHANGE UP
UROBILINOGEN FLD QL: SIGNIFICANT CHANGE UP
WBC # BLD: 7.32 K/UL — SIGNIFICANT CHANGE UP (ref 3.8–10.5)
WBC # FLD AUTO: 7.32 K/UL — SIGNIFICANT CHANGE UP (ref 3.8–10.5)

## 2022-08-19 PROCEDURE — 74018 RADEX ABDOMEN 1 VIEW: CPT | Mod: 26

## 2022-08-19 PROCEDURE — 71045 X-RAY EXAM CHEST 1 VIEW: CPT | Mod: 26,59

## 2022-08-19 PROCEDURE — 99221 1ST HOSP IP/OBS SF/LOW 40: CPT | Mod: GC

## 2022-08-19 PROCEDURE — 71046 X-RAY EXAM CHEST 2 VIEWS: CPT | Mod: 26

## 2022-08-19 PROCEDURE — 99285 EMERGENCY DEPT VISIT HI MDM: CPT

## 2022-08-19 PROCEDURE — 74177 CT ABD & PELVIS W/CONTRAST: CPT | Mod: 26,MA

## 2022-08-19 RX ORDER — ACETAMINOPHEN 500 MG
1000 TABLET ORAL ONCE
Refills: 0 | Status: DISCONTINUED | OUTPATIENT
Start: 2022-08-19 | End: 2022-08-20

## 2022-08-19 RX ORDER — LABETALOL HCL 100 MG
5 TABLET ORAL EVERY 6 HOURS
Refills: 0 | Status: DISCONTINUED | OUTPATIENT
Start: 2022-08-19 | End: 2022-08-20

## 2022-08-19 RX ORDER — LABETALOL HCL 100 MG
100 TABLET ORAL DAILY
Refills: 0 | Status: DISCONTINUED | OUTPATIENT
Start: 2022-08-19 | End: 2022-08-19

## 2022-08-19 RX ORDER — SODIUM CHLORIDE 9 MG/ML
1000 INJECTION, SOLUTION INTRAVENOUS
Refills: 0 | Status: DISCONTINUED | OUTPATIENT
Start: 2022-08-19 | End: 2022-08-20

## 2022-08-19 RX ORDER — DIATRIZOATE MEGLUMINE 180 MG/ML
100 INJECTION, SOLUTION INTRAVESICAL ONCE
Refills: 0 | Status: DISCONTINUED | OUTPATIENT
Start: 2022-08-19 | End: 2022-08-20

## 2022-08-19 RX ORDER — LABETALOL HCL 100 MG
5 TABLET ORAL ONCE
Refills: 0 | Status: DISCONTINUED | OUTPATIENT
Start: 2022-08-19 | End: 2022-08-19

## 2022-08-19 RX ORDER — SODIUM CHLORIDE 9 MG/ML
1000 INJECTION INTRAMUSCULAR; INTRAVENOUS; SUBCUTANEOUS ONCE
Refills: 0 | Status: COMPLETED | OUTPATIENT
Start: 2022-08-19 | End: 2022-08-19

## 2022-08-19 RX ORDER — MORPHINE SULFATE 50 MG/1
4 CAPSULE, EXTENDED RELEASE ORAL ONCE
Refills: 0 | Status: DISCONTINUED | OUTPATIENT
Start: 2022-08-19 | End: 2022-08-19

## 2022-08-19 RX ORDER — LOSARTAN POTASSIUM 100 MG/1
1 TABLET, FILM COATED ORAL
Qty: 0 | Refills: 0 | DISCHARGE

## 2022-08-19 RX ORDER — PANTOPRAZOLE SODIUM 20 MG/1
40 TABLET, DELAYED RELEASE ORAL DAILY
Refills: 0 | Status: DISCONTINUED | OUTPATIENT
Start: 2022-08-19 | End: 2022-08-20

## 2022-08-19 RX ORDER — ONDANSETRON 8 MG/1
4 TABLET, FILM COATED ORAL ONCE
Refills: 0 | Status: COMPLETED | OUTPATIENT
Start: 2022-08-19 | End: 2022-08-19

## 2022-08-19 RX ORDER — HEPARIN SODIUM 5000 [USP'U]/ML
5000 INJECTION INTRAVENOUS; SUBCUTANEOUS EVERY 8 HOURS
Refills: 0 | Status: DISCONTINUED | OUTPATIENT
Start: 2022-08-19 | End: 2022-08-21

## 2022-08-19 RX ADMIN — Medication 5 MILLIGRAM(S): at 19:22

## 2022-08-19 RX ADMIN — MORPHINE SULFATE 4 MILLIGRAM(S): 50 CAPSULE, EXTENDED RELEASE ORAL at 02:42

## 2022-08-19 RX ADMIN — SODIUM CHLORIDE 100 MILLILITER(S): 9 INJECTION, SOLUTION INTRAVENOUS at 22:52

## 2022-08-19 RX ADMIN — SODIUM CHLORIDE 100 MILLILITER(S): 9 INJECTION, SOLUTION INTRAVENOUS at 09:45

## 2022-08-19 RX ADMIN — SODIUM CHLORIDE 1000 MILLILITER(S): 9 INJECTION INTRAMUSCULAR; INTRAVENOUS; SUBCUTANEOUS at 04:00

## 2022-08-19 RX ADMIN — ONDANSETRON 4 MILLIGRAM(S): 8 TABLET, FILM COATED ORAL at 02:42

## 2022-08-19 RX ADMIN — PANTOPRAZOLE SODIUM 40 MILLIGRAM(S): 20 TABLET, DELAYED RELEASE ORAL at 13:04

## 2022-08-19 RX ADMIN — SODIUM CHLORIDE 1000 MILLILITER(S): 9 INJECTION INTRAMUSCULAR; INTRAVENOUS; SUBCUTANEOUS at 02:42

## 2022-08-19 RX ADMIN — MORPHINE SULFATE 4 MILLIGRAM(S): 50 CAPSULE, EXTENDED RELEASE ORAL at 02:50

## 2022-08-19 RX ADMIN — HEPARIN SODIUM 5000 UNIT(S): 5000 INJECTION INTRAVENOUS; SUBCUTANEOUS at 22:51

## 2022-08-19 NOTE — H&P ADULT - NSHPLABSRESULTS_GEN_ALL_CORE
ACC: [id number] EXAM: CT ABDOMEN AND PELVIS IC    PROCEDURE DATE: 08/19/2022        INTERPRETATION: CLINICAL INFORMATION: Abdominal pain, nausea vomiting    COMPARISON: None.    CONTRAST/COMPLICATIONS:  IV Contrast: Omnipaque 350 60 cc administered  Oral Contrast: NONE  Complications: None reported at time of study completion    PROCEDURE:  CT of the Abdomen and Pelvis was performed.  Sagittal and coronal reformats were performed.    FINDINGS:  LOWER CHEST: Patchy bibasilar infiltrates, likely atelectasis    LIVER: Stable left hepatic lobe cyst and approximately 4 cm subcapsular right hepatic lobe mass.  BILE DUCTS: Normal caliber.  GALLBLADDER: Within normal limits.  SPLEEN: Within normal limits.  PANCREAS: Within normal limits.  ADRENALS: Within normal limits.  KIDNEYS/URETERS: Symmetric renal enhancement. No hydronephrosis. Bilateral renal cysts. Punctate nonobstructing stone in the upper pole right kidney    BLADDER: Within normal limits.  REPRODUCTIVE ORGANS: Complaining cm simple appearing right ovarian cyst.    BOWEL: High-grade small bowel obstruction with transition in the right lower quadrant. Patient is status post subtotal colectomy with ileosigmoid anastomosis. Small bowel containing right lower quadrant Spigellian hernia, presumably related to prior ostomy.    PERITONEUM: Small volume ascites in the upper abdomen. Mesenteric edema adjacent to the obstructed loops in the left abdomen  VESSELS: Normal caliber  RETROPERITONEUM/LYMPH NODES: No enlarged lymph node measuring greater than 10 mm in short axis  ABDOMINAL WALL: Rectus diastases. Small fat-containing umbilical hernia.  BONES: Degenerative changes. Grade 1 L1 on L2 retrolisthesis.    IMPRESSION:  High-grade small bowel obstruction with transition in the right lower quadrant. There is mesenteric interloop edema adjacent to obstructive loops in the left abdomen.    Other nonacute findings, detailed above.    --- End of Report ---

## 2022-08-19 NOTE — ASU PATIENT PROFILE, ADULT - FALL HARM RISK - HARM RISK INTERVENTIONS

## 2022-08-19 NOTE — ED ADULT NURSE REASSESSMENT NOTE - NS ED NURSE REASSESS COMMENT FT1
NGT to intermittent wall suction draining greenish yellow secretion. patient c/o discomfort from NGT. admitted to [facility]/report given to KENTON Aldana. pending transport.

## 2022-08-19 NOTE — ED PROVIDER NOTE - PHYSICAL EXAMINATION
Gen: NAD, AAOx3, non-toxic appearing  HEENT: NCAT, normal conjunctiva, oral mucosa moist  Lung: speaking in full sentences, good aeration bilaterally, lungs CTA b/l  CV: regular rate and rhythm. cap refill <2x. peripheral pulses 2+bilaterally   Abd: soft, moderately distended, epigastric and L sided ttp, no rebound or guarding  MSK: no visible deformities  Neuro: No focal deficits  Skin: Intact  Psych: normal affect

## 2022-08-19 NOTE — ED PROVIDER NOTE - ATTENDING CONTRIBUTION TO CARE
Dr. Rdz:  I have personally performed a face to face bedside history and physical examination of this patient. I have discussed the history, examination, review of systems, assessment and plan of management with the resident. I have reviewed the electronic medical record and amended it to reflect my history, review of systems, physical exam, assessment and plan.    82F h/o lower GI bleed s/p subtotal colectomy with end ileostomy  that was subsequently reversed and parastomal hernia repair for incarceration presents with 1 day of epigastric pain and associated N/V.  Felt like she couldn't "catch her breath" due to pain.  Denies fevers/chills, cp, diarrhea, urinary symptoms.  Last BM yesterday    Exam:  - nad  - rrr  - ctab   -abd soft, diffusely TTP    A/P  - abd pain, eval obstruction, infection, other pathology; r/o ACS given epigastric component  - cbc, cmp, lipase, vbg, CT abd/pelvis

## 2022-08-19 NOTE — H&P ADULT - ASSESSMENT
81 y/o F p/w high grade SBO likely 2/2 adhesions.    -Admit to Dr. Tipton  -NPO, NGT  -mIVF  -GG challenge after decompression  -Restart PO meds once NGT out  -No pain meds without exam  -AM lactate    D/w Dr. Tipton, [facility] attending on call    Macey Matthews MD  PGY-2  B Team Surgery ([facility])  #[id number]

## 2022-08-19 NOTE — ED PROVIDER NOTE - OBJECTIVE STATEMENT
83 yo F with hx of HTN, diverticulitis s/p colectomy and reversal, GERD presenting with abdominal pain x 1 day. Patient reports progressively worsening crampy abdominal pain worse in the epigastric region associated with nausea and vomiting. Unable to tolerate PO at home. Denies fevers/chills. Reports three normal BMs throughout the day. Denies bloody or dark stools.

## 2022-08-19 NOTE — ED ADULT NURSE REASSESSMENT NOTE - NS ED NURSE REASSESS COMMENT FT1
patient AOX3 speaks [place], daughter Radha by bed side translating. denies any pain. denies any n/v. NAD. breathing even and unlabored. awaiting on bed. patient AOX3 speaks [place], daughter Radha by bed side translating. denies any pain. denies any n/v. NAD. breathing even and unlabored. awaiting on admission

## 2022-08-19 NOTE — H&P ADULT - HISTORY OF PRESENT ILLNESS
83 y/o F w/ PMH HTN, subtotal colectomy w/ end ileostomy 2/2 LGIB from extensive diverticulosis (Dr. Patel, 2012), end ileostomy reversal (2013), parastomal hernia leading to SBO requiring ex lap and mesh placement (2013, Dr. Waldrop), multiple adhesive SBO's, p/w 2d abdominal pain, nausea vomiting, and PO intolerance. The patient was in her usual state of health when she began having diffuse abdominal pain in the epigastric region. She was passing gas and BM as of yesterday.     In the ED, she was hemodynamically stable. Lactate of 1.6, no leukocytosis. 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 Umbilical hernia and spigelian hernia unchanged from prior CT in 2017.

## 2022-08-19 NOTE — H&P ADULT - ATTENDING COMMENTS
I have reviewed the history, pertinent labs and imaging, and discussed the care with the consult resident. Time included review of vitals, labs, imaging and coordination with the emergency department.    Chief complaint:  abdominal pain    The active issues are:  1. presumed recurent adhesive small bowel obstructive in anticipated hostile surgical abdomen    The patient is nontender and the abdominal wall hernias are chronic and appear unchanged in character compared to CT scan from 2017.  Will attempt medical management with npo, iv hydration, ngt decompression and gastrografin challenge after adequate resuscitation and decompression.  May consider surgical intervention if above measures fail to resolve her SBO.    The Acute Care Surgery (B Team) Practice:    urgent issues - spectra [phone]  nonurgent issues - [phone]  patient appointments or afterhours - [phone]

## 2022-08-19 NOTE — ED PROVIDER NOTE - NSICDXPASTMEDICALHX_GEN_ALL_CORE_FT
PAST MEDICAL HISTORY:  Diverticulitis of colon with hemorrhage     GERD (gastroesophageal reflux disease)     HTN (hypertension)     Osteoporosis

## 2022-08-19 NOTE — ED PROVIDER NOTE - NSICDXPASTSURGICALHX_GEN_ALL_CORE_FT
PAST SURGICAL HISTORY:  H/O colectomy subtotal, s/p reversal in 4/2013    Parastomal hernia Repaired in 2014

## 2022-08-19 NOTE — ED ADULT NURSE NOTE - OBJECTIVE STATEMENT
82 yof presents A&Ox4, primarily Creole speaking - daughter at bedside translating. Pt offered translation services but refused. pt c/o abdominal pain, nausea and vomiting x1 day. PMHx diverticulitis and SBO. Last normal bowel movement was yesterday, +flatus. Respirations even and unlabored, sating 100% on RA. pt denies any chest pain, dyspnea, dizziness, blurry vision, blood in vomit, diarrhea or fevers. 20g IV placed in R AC, labs sent per order. Pt well appearing, NAD noted. bed in lowest position, side rails up, call bell in hand, safety maintained. family at bedside.

## 2022-08-19 NOTE — ED PROVIDER NOTE - CLINICAL SUMMARY MEDICAL DECISION MAKING FREE TEXT BOX
81 yo F with hx of HTN, diverticulitis s/p colectomy and reversal, GERD presenting with abdominal pain x 1 day. Epigastric crampy pain a/w nausea and vomiting, decreased PO intake. Uncomfortable, nontoxic appearing, soft abd mildly distended with ttp mainly in epigastric/L sided abdominal region. Differential includes colitis vs diverticulitis vs gastritis vs pancreatitis. Less likely SBO. Plan for labs, symptom control, CTAP and dispo pending results.

## 2022-08-20 LAB
ANION GAP SERPL CALC-SCNC: 14 MMOL/L — SIGNIFICANT CHANGE UP (ref 7–14)
BUN SERPL-MCNC: 11 MG/DL — SIGNIFICANT CHANGE UP (ref 7–23)
CALCIUM SERPL-MCNC: 9.1 MG/DL — SIGNIFICANT CHANGE UP (ref 8.4–10.5)
CHLORIDE SERPL-SCNC: 102 MMOL/L — SIGNIFICANT CHANGE UP (ref 98–107)
CO2 SERPL-SCNC: 24 MMOL/L — SIGNIFICANT CHANGE UP (ref 22–31)
CREAT SERPL-MCNC: 0.7 MG/DL — SIGNIFICANT CHANGE UP (ref 0.5–1.3)
EGFR: 87 ML/MIN/1.73M2 — SIGNIFICANT CHANGE UP
GLUCOSE SERPL-MCNC: 82 MG/DL — SIGNIFICANT CHANGE UP (ref 70–99)
HCT VFR BLD CALC: 40.8 % — SIGNIFICANT CHANGE UP (ref 34.5–45)
HGB BLD-MCNC: 13 G/DL — SIGNIFICANT CHANGE UP (ref 11.5–15.5)
LACTATE BLDV-MCNC: 1.6 MMOL/L — SIGNIFICANT CHANGE UP (ref 0.5–2)
MAGNESIUM SERPL-MCNC: 1.9 MG/DL — SIGNIFICANT CHANGE UP (ref 1.6–2.6)
MCHC RBC-ENTMCNC: 26.3 PG — LOW (ref 27–34)
MCHC RBC-ENTMCNC: 31.9 GM/DL — LOW (ref 32–36)
MCV RBC AUTO: 82.4 FL — SIGNIFICANT CHANGE UP (ref 80–100)
NRBC # BLD: 0 /100 WBCS — SIGNIFICANT CHANGE UP (ref 0–0)
NRBC # FLD: 0 K/UL — SIGNIFICANT CHANGE UP (ref 0–0)
PHOSPHATE SERPL-MCNC: 2.7 MG/DL — SIGNIFICANT CHANGE UP (ref 2.5–4.5)
PLATELET # BLD AUTO: 230 K/UL — SIGNIFICANT CHANGE UP (ref 150–400)
POTASSIUM SERPL-MCNC: 3.1 MMOL/L — LOW (ref 3.5–5.3)
POTASSIUM SERPL-SCNC: 3.1 MMOL/L — LOW (ref 3.5–5.3)
RBC # BLD: 4.95 M/UL — SIGNIFICANT CHANGE UP (ref 3.8–5.2)
RBC # FLD: 14.9 % — HIGH (ref 10.3–14.5)
SODIUM SERPL-SCNC: 140 MMOL/L — SIGNIFICANT CHANGE UP (ref 135–145)
WBC # BLD: 6.44 K/UL — SIGNIFICANT CHANGE UP (ref 3.8–10.5)
WBC # FLD AUTO: 6.44 K/UL — SIGNIFICANT CHANGE UP (ref 3.8–10.5)

## 2022-08-20 PROCEDURE — 99232 SBSQ HOSP IP/OBS MODERATE 35: CPT | Mod: GC

## 2022-08-20 RX ORDER — PANTOPRAZOLE SODIUM 20 MG/1
40 TABLET, DELAYED RELEASE ORAL
Refills: 0 | Status: DISCONTINUED | OUTPATIENT
Start: 2022-08-20 | End: 2022-08-21

## 2022-08-20 RX ORDER — LABETALOL HCL 100 MG
100 TABLET ORAL DAILY
Refills: 0 | Status: DISCONTINUED | OUTPATIENT
Start: 2022-08-20 | End: 2022-08-21

## 2022-08-20 RX ORDER — POTASSIUM CHLORIDE 20 MEQ
40 PACKET (EA) ORAL EVERY 4 HOURS
Refills: 0 | Status: COMPLETED | OUTPATIENT
Start: 2022-08-20 | End: 2022-08-20

## 2022-08-20 RX ORDER — ACETAMINOPHEN 500 MG
975 TABLET ORAL EVERY 6 HOURS
Refills: 0 | Status: DISCONTINUED | OUTPATIENT
Start: 2022-08-20 | End: 2022-08-21

## 2022-08-20 RX ADMIN — Medication 40 MILLIEQUIVALENT(S): at 18:29

## 2022-08-20 RX ADMIN — Medication 40 MILLIEQUIVALENT(S): at 13:49

## 2022-08-20 RX ADMIN — HEPARIN SODIUM 5000 UNIT(S): 5000 INJECTION INTRAVENOUS; SUBCUTANEOUS at 05:51

## 2022-08-20 RX ADMIN — HEPARIN SODIUM 5000 UNIT(S): 5000 INJECTION INTRAVENOUS; SUBCUTANEOUS at 23:05

## 2022-08-20 RX ADMIN — HEPARIN SODIUM 5000 UNIT(S): 5000 INJECTION INTRAVENOUS; SUBCUTANEOUS at 13:50

## 2022-08-20 RX ADMIN — Medication 975 MILLIGRAM(S): at 13:48

## 2022-08-20 RX ADMIN — Medication 100 MILLIGRAM(S): at 13:48

## 2022-08-20 RX ADMIN — Medication 5 MILLIGRAM(S): at 05:51

## 2022-08-20 NOTE — PROGRESS NOTE ADULT - ATTENDING COMMENTS
I have personally interviewed and examined this patient, reviewed pertinent labs and imaging, and discussed the case with colleagues, residents, and physician assistants on B Team rounds.  More than 50% of this 35 minute encounter including face to face with the patient was spent counseling and/or coordination of care on SBO.  Time included review of vitals, labs, imaging, discussion with consultants.    81yo F h/o subtotal colectomy for bleeding admitted 8/19 with SBO, likely adhesive. Pt passed gastrographin challenge with contrast in the rectum and multiple bowel movements. Pt is taking clears, denies nausea. Also passing flatus. Abd softly distended, nontender. Pt feels almost back to normal.     - Advance diet to regular as tolerated   - Anticipate discharge home in the morning after two meals     The active care issues are:  1. SBO    The Acute Care Surgery (B Team) Attending Group Practice:  Dr. Arlette Mora    urgent issues - spectra [phone]  nonurgent issues - [phone]  patient appointments or afterhours - [phone]

## 2022-08-20 NOTE — PROGRESS NOTE ADULT - SUBJECTIVE AND OBJECTIVE BOX
Overnight events:  -Gastrografin in the rectum on 2200 xray Overnight events:  -Gastrografin in the rectum on 2200 xray    SUBJECTIVE: Pt seen and examined at bedside. Patient comfortable and in no-apparent distress. Patient having BM's, passing gas. GG seen in the rectum on XR overnight, NGT pulled this AM. Advance to CLD.     Vital Signs Last 24 Hrs  T(C): 37.2 (20 Aug 2022 10:23), Max: 37.5 (20 Aug 2022 01:45)  T(F): 99 (20 Aug 2022 10:23), Max: 99.5 (20 Aug 2022 01:45)  HR: 99 (20 Aug 2022 10:23) (78 - 99)  BP: 139/90 (20 Aug 2022 10:23) (139/90 - 170/102)  BP(mean): --  RR: 18 (20 Aug 2022 10:23) (16 - 20)  SpO2: 95% (20 Aug 2022 10:23) (95% - 100%)    Parameters below as of 20 Aug 2022 10:23  Patient On (Oxygen Delivery Method): room air      Physical Exam:  General Appearance: Appears well, NAD  Respiratory: No labored breathing  CV: Pulse regularly present  Abdomen: Soft, nontender, non-distended    LABS:                        13.0   6.44  )-----------( 230      ( 20 Aug 2022 05:37 )             40.8     08-20    140  |  102  |  11  ----------------------------<  82  3.1<L>   |  24  |  0.70    Ca    9.1      20 Aug 2022 05:37  Phos  2.7     08-20  Mg     1.90     08-20    TPro  8.9<H>  /  Alb  4.5  /  TBili  0.7  /  DBili  x   /  AST  22  /  ALT  14  /  AlkPhos  97  08-19    PT/INR - ( 19 Aug 2022 02:40 )   PT: 13.6 sec;   INR: 1.17 ratio         PTT - ( 19 Aug 2022 02:40 )  PTT:29.3 sec  Urinalysis Basic - ( 19 Aug 2022 06:14 )    Color: Light Yellow / Appearance: Clear / S.020 / pH: x  Gluc: x / Ketone: Negative  / Bili: Negative / Urobili: <2 mg/dL   Blood: x / Protein: Trace / Nitrite: Negative   Leuk Esterase: Negative / RBC: x / WBC x   Sq Epi: x / Non Sq Epi: x / Bacteria: x        INs and OUTs:    22 @ 07:01  -  22 @ 07:00  --------------------------------------------------------  IN: 0 mL / OUT: 100 mL / NET: -100 mL

## 2022-08-20 NOTE — PROGRESS NOTE ADULT - ASSESSMENT
83 y/o F p/w high grade SBO likely 2/2 adhesions. Passed gastrografin challenge    -D/C NGT  -Advance to CLD  -mIVF  -Restart PO meds  -No pain meds without exam  -AM lactate  -Monitor for bowel function  -DVT ppx    B Team Surgery (ACS)  #[id number] 83 y/o F p/w high grade SBO likely 2/2 adhesions. Passed gastrografin challenge, NGT removed.     -D/C NGT  -Advance to CLD  -Restart PO meds: Labetolol, PPI  -No pain meds without exam  -OOB and ambulate  -DVT ppx: Heparin    B Team Surgery (ACS)  #[id number]

## 2022-08-21 ENCOUNTER — TRANSCRIPTION ENCOUNTER (OUTPATIENT)
Age: 82
End: 2022-08-21

## 2022-08-21 VITALS
OXYGEN SATURATION: 100 % | HEART RATE: 83 BPM | RESPIRATION RATE: 18 BRPM | TEMPERATURE: 98 F | SYSTOLIC BLOOD PRESSURE: 142 MMHG | DIASTOLIC BLOOD PRESSURE: 93 MMHG

## 2022-08-21 LAB
ANION GAP SERPL CALC-SCNC: 14 MMOL/L — SIGNIFICANT CHANGE UP (ref 7–14)
BUN SERPL-MCNC: 12 MG/DL — SIGNIFICANT CHANGE UP (ref 7–23)
CALCIUM SERPL-MCNC: 9.4 MG/DL — SIGNIFICANT CHANGE UP (ref 8.4–10.5)
CHLORIDE SERPL-SCNC: 105 MMOL/L — SIGNIFICANT CHANGE UP (ref 98–107)
CO2 SERPL-SCNC: 20 MMOL/L — LOW (ref 22–31)
CREAT SERPL-MCNC: 0.69 MG/DL — SIGNIFICANT CHANGE UP (ref 0.5–1.3)
CULTURE RESULTS: SIGNIFICANT CHANGE UP
EGFR: 87 ML/MIN/1.73M2 — SIGNIFICANT CHANGE UP
GLUCOSE SERPL-MCNC: 84 MG/DL — SIGNIFICANT CHANGE UP (ref 70–99)
HCT VFR BLD CALC: 44.2 % — SIGNIFICANT CHANGE UP (ref 34.5–45)
HGB BLD-MCNC: 13.5 G/DL — SIGNIFICANT CHANGE UP (ref 11.5–15.5)
MAGNESIUM SERPL-MCNC: 2 MG/DL — SIGNIFICANT CHANGE UP (ref 1.6–2.6)
MCHC RBC-ENTMCNC: 25.5 PG — LOW (ref 27–34)
MCHC RBC-ENTMCNC: 30.5 GM/DL — LOW (ref 32–36)
MCV RBC AUTO: 83.4 FL — SIGNIFICANT CHANGE UP (ref 80–100)
NRBC # BLD: 0 /100 WBCS — SIGNIFICANT CHANGE UP (ref 0–0)
NRBC # FLD: 0 K/UL — SIGNIFICANT CHANGE UP (ref 0–0)
PHOSPHATE SERPL-MCNC: 2.5 MG/DL — SIGNIFICANT CHANGE UP (ref 2.5–4.5)
PLATELET # BLD AUTO: 223 K/UL — SIGNIFICANT CHANGE UP (ref 150–400)
POTASSIUM SERPL-MCNC: 4.4 MMOL/L — SIGNIFICANT CHANGE UP (ref 3.5–5.3)
POTASSIUM SERPL-SCNC: 4.4 MMOL/L — SIGNIFICANT CHANGE UP (ref 3.5–5.3)
RBC # BLD: 5.3 M/UL — HIGH (ref 3.8–5.2)
RBC # FLD: 15.1 % — HIGH (ref 10.3–14.5)
SODIUM SERPL-SCNC: 139 MMOL/L — SIGNIFICANT CHANGE UP (ref 135–145)
SPECIMEN SOURCE: SIGNIFICANT CHANGE UP
WBC # BLD: 6.07 K/UL — SIGNIFICANT CHANGE UP (ref 3.8–10.5)
WBC # FLD AUTO: 6.07 K/UL — SIGNIFICANT CHANGE UP (ref 3.8–10.5)

## 2022-08-21 RX ORDER — ACETAMINOPHEN 500 MG
3 TABLET ORAL
Qty: 0 | Refills: 0 | DISCHARGE
Start: 2022-08-21

## 2022-08-21 RX ORDER — SODIUM,POTASSIUM PHOSPHATES 278-250MG
1 POWDER IN PACKET (EA) ORAL ONCE
Refills: 0 | Status: COMPLETED | OUTPATIENT
Start: 2022-08-21 | End: 2022-08-21

## 2022-08-21 RX ADMIN — Medication 100 MILLIGRAM(S): at 05:18

## 2022-08-21 RX ADMIN — Medication 1 PACKET(S): at 10:20

## 2022-08-21 RX ADMIN — HEPARIN SODIUM 5000 UNIT(S): 5000 INJECTION INTRAVENOUS; SUBCUTANEOUS at 05:18

## 2022-08-21 RX ADMIN — PANTOPRAZOLE SODIUM 40 MILLIGRAM(S): 20 TABLET, DELAYED RELEASE ORAL at 05:19

## 2022-08-21 NOTE — PROGRESS NOTE ADULT - SUBJECTIVE AND OBJECTIVE BOX
No acute overnight events.  TEAM Surgery Progress Note  Patient is a 82y old  Female who presents with a chief complaint of Abdominal pain (21 Aug 2022 00:15)      INTERVAL EVENTS: No acute events overnight.    SUBJECTIVE: Patient seen and examined at bedside with surgical team, patient without complaints. Denies nausea, vomiting, abdominal pain. +BM.    REVIEW OF SYSTEMS:  Constitutional: No fevers or chills. No malaise or weakness.  EENT: No vision changes. No ear pain. No nasal congestion or rhinitis. No throat pain or dysphagia.  Respiratory: No cough, wheezing, or SOB. No hemoptysis.  Cardiovascular: No chest pain or palpitations.  Gastrointestinal: No abdominal pain. No nausea, vomiting, diarrhea or constipation. No hematochezia. No melena.  Genitourinary: No dysuria, hematuria, or frequency.  Neurologic: No numbness or tingling. No weakness.  Skin: No rashes or pruritus.     OBJECTIVE:    Vital Signs Last 24 Hrs  T(C): 36.5 (21 Aug 2022 05:15), Max: 37.4 (21 Aug 2022 01:53)  T(F): 97.7 (21 Aug 2022 05:15), Max: 99.4 (21 Aug 2022 01:53)  HR: 80 (21 Aug 2022 05:15) (80 - 99)  BP: 148/100 (21 Aug 2022 05:15) (132/89 - 151/95)  BP(mean): --  RR: 18 (21 Aug 2022 05:15) (18 - 18)  SpO2: 100% (21 Aug 2022 05:15) (95% - 100%)    Parameters below as of 21 Aug 2022 05:15  Patient On (Oxygen Delivery Method): room air    I&O's Detail    20 Aug 2022 07:01  -  21 Aug 2022 07:00  --------------------------------------------------------  IN:    Lactated Ringers: 600 mL    Oral Fluid: 960 mL  Total IN: 1560 mL    OUT:    Blood Loss (mL): 0 mL  Total OUT: 0 mL    Total NET: 1560 mL      MEDICATIONS  (STANDING):  acetaminophen     Tablet .. 975 milliGRAM(s) Oral every 6 hours  heparin   Injectable 5000 Unit(s) SubCutaneous every 8 hours  labetalol 100 milliGRAM(s) Oral daily  pantoprazole    Tablet 40 milliGRAM(s) Oral before breakfast    MEDICATIONS  (PRN):      PHYSICAL EXAM:  Constitutional: A&Ox3, NAD  Respiratory: Unlabored breathing  Abdomen: Soft, nondistended, NTTP. No rebound or guarding.  Extremities: WWP, BREWER spontaneously    LABS:                        13.5   6.07  )-----------( 223      ( 21 Aug 2022 05:40 )             44.2     08-21    139  |  105  |  12  ----------------------------<  84  4.4   |  20<L>  |  0.69    Ca    9.4      21 Aug 2022 05:40  Phos  2.5     08-21  Mg     2.00     08-21

## 2022-08-21 NOTE — DISCHARGE NOTE PROVIDER - NSDCMRMEDTOKEN_GEN_ALL_CORE_FT
acetaminophen 325 mg oral tablet: 3 tab(s) orally every 6 hours  labetalol 100 mg oral tablet: 1 tab(s) orally once a day  omeprazole 40 mg oral delayed release capsule: 1 cap(s) orally once a day

## 2022-08-21 NOTE — DISCHARGE NOTE NURSING/CASE MANAGEMENT/SOCIAL WORK - NSDCPEFALRISK_GEN_ALL_CORE
For information on Fall & Injury Prevention, visit: [email or web address] OR  [email or web address] OR  https://www.cdc.gov/steadi/patient.html

## 2022-08-21 NOTE — DISCHARGE NOTE PROVIDER - CARE PROVIDER_API CALL
Alexandrea Tipton)  Surgery; Surgical Critical Care  [phone] [place] [address]  Phone: [phone]  Fax: [phone]  Follow Up Time: 2 weeks

## 2022-08-21 NOTE — PROGRESS NOTE ADULT - ATTENDING COMMENTS
83yo F h/o subtotal colectomy for bleeding admitted 8/19 with SBO, likely adhesive, now resolved. Pt tolerating regular diet, having bowel movements and flatus     - Discharge home   - F/U as needed     The active care issues are:  1. SBO    The Acute Care Surgery (B Team) Attending Group Practice:  Dr. Arlette Mora    urgent issues - spectra [phone]  nonurgent issues - [phone]  patient appointments or afterhours - [phone] .

## 2022-08-21 NOTE — DISCHARGE NOTE PROVIDER - HOSPITAL COURSE
81yo F h/o subtotal colectomy for bleeding admitted 8/19 with SBO, likely adhesive. Pt passed gastrographin challenge with contrast in the rectum and multiple bowel movements. Pt is taking clears, denies nausea. Also passing flatus. Abd softly distended, nontender. Pt feels almost back to normal.     Pt tolerated regular diet, is stable to dc today.

## 2022-08-21 NOTE — PROGRESS NOTE ADULT - ASSESSMENT
81 y/o F p/w high grade SBO likely 2/2 adhesions. Passed gastrografin challenge, NGT removed.     -D/C NGT  -LRD   -Restart PO meds: Labetolol, PPI  -No pain meds without exam  -OOB and ambulate  -DVT ppx: Heparin  -Dispo: Home    B Team Surgery (ACS)  #[id number] 81 y/o F p/w high grade SBO likely 2/2 adhesions. Passed gastrografin challenge, NGT removed. +BM.    -D/C NGT  -LRD   -Restart PO meds: Labetolol, PPI  -No pain meds without exam  -OOB and ambulate  -DVT ppx: Heparin  -Dispo: Home    B Team Surgery (ACS)  #[id number]

## 2022-08-21 NOTE — DISCHARGE NOTE NURSING/CASE MANAGEMENT/SOCIAL WORK - NSDCVIVACCINE_GEN_ALL_CORE_FT
Tdap; 08-Apr-2016 08:51; Izabela Hampton ([place]); [employer]; [id number]; IntraMuscular; Deltoid Left.; 0.5 milliLiter(s); VIS (VIS Published: 09-May-2013, VIS Presented: 08-Apr-2016);

## 2022-09-07 NOTE — DISCHARGE NOTE ADULT - PATIENT PORTAL LINK FT
98.7 “You can access the FollowHealth Patient Portal, offered by [facility], by registering with the following website: [email or web address]”

## 2022-09-09 ENCOUNTER — APPOINTMENT (OUTPATIENT)
Dept: TRAUMA SURGERY | Facility: HOSPITAL | Age: 82
End: 2022-09-09

## 2022-09-09 VITALS
BODY MASS INDEX: 29.64 KG/M2 | SYSTOLIC BLOOD PRESSURE: 150 MMHG | HEIGHT: 60 IN | HEART RATE: 93 BPM | DIASTOLIC BLOOD PRESSURE: 90 MMHG | WEIGHT: 151 LBS | TEMPERATURE: 98.7 F

## 2022-09-09 PROCEDURE — 99213 OFFICE O/P EST LOW 20 MIN: CPT

## 2022-09-14 NOTE — ASSESSMENT
[FreeTextEntry1] : aSBO resolved\par asymptomatic ventral incisional hernias\par PUD\par -continue H2B\par \par Patient, as per her daughter, who translated (they both declined [employer] phone ) prefers expectant management, no surgical interventions unless absolutely necessary.  They understand the nature and course of aSBO and agree with plan.\par \par F/u prn

## 2022-09-14 NOTE — PHYSICAL EXAM
[Normal Breath Sounds] : Normal breath sounds [Normal Heart Sounds] : normal heart sounds [No Rash or Lesion] : No rash or lesion [Alert] : alert [Oriented to Person] : oriented to person [Oriented to Place] : oriented to place [Oriented to Time] : oriented to time [Calm] : calm [de-identified] : healthy appearing older woman, NAD [de-identified] : anicteric, moist membranes [de-identified] : supple [de-identified] : doughy soft, NT/ND, well healed midline and RLQ surgical incisions, no large hernias appreciated [de-identified] : no CVA tenderness to percussion [de-identified] : no calf tenderness, no ankle edema

## 2022-09-14 NOTE — HISTORY OF PRESENT ILLNESS
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 Patel, 2012), end ileostomy reversal (2013), parastomal hernia leading to SBO requiring ex lap and mesh placement (2013, Dr. Waldrop), multiple adhesive SBO's, p/w 2d abdominal pain, nausea vomiting, and PO intolerance. The patient was in her usual state of health when she began having diffuse abdominal pain in the epigastric region. She was passing gas and BM as of yesterday. \par \par In the ED, she was hemodynamically stable. Lactate of 1.6, no leukocytosis. 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 Umbilical hernia and spigelian hernia unchanged from prior CT in 2017.\par \par No Known Allergies: \par \par Home Medications: \par · 	omeprazole 40 mg oral delayed release capsule: Last Dose Taken:  , 1 cap(s) orally once a day\par · 	labetalol 100 mg oral tablet: Last Dose Taken:  , 1 tab(s) orally once a day\par \par PAST MEDICAL HISTORY:\par Diverticulitis of colon with hemorrhage \par \par GERD (gastroesophageal reflux disease) \par \par HTN (hypertension) \par \par Osteoporosis. \par \par PAST SURGICAL HISTORY:\par H/O colectomy subtotal, s/p reversal in 4/2013\par \par Parastomal hernia Repaired in 2014. [de-identified] : Admitted 8/19 with SBO, likely adhesive. Pt passed gastrographin challenge with contrast in the rectum and multiple bowel movements. Pt tolerated regular diet and was discharged to home.  Since being home, she reports no fever, nausea, vomiting, abdominal pain, distention, constipation.  No dysuria, chest pain, shortness of breath.  She is taking soft foods and reports occasional epigastric/substernal discomfort for which she had previously reported relief with pepcid OTC.

## 2022-10-18 NOTE — ASU PATIENT PROFILE, ADULT - AS SC BRADEN NUTRITION
(2) probably inadequate Rifampin Pregnancy And Lactation Text: This medication is Pregnancy Category C and it isn't know if it is safe during pregnancy. It is also excreted in breast milk and should not be used if you are breast feeding.

## 2023-06-07 NOTE — ED PROVIDER NOTE - CARDIOVASCULAR [-], MLM
Normal vision: sees adequately in most situations; can see medication labels, newsprint no palpitations

## 2023-06-21 ENCOUNTER — INPATIENT (INPATIENT)
Facility: HOSPITAL | Age: 83
LOS: 4 days | Discharge: HOME | End: 2023-06-26
Attending: SURGERY | Admitting: SURGERY
Payer: MEDICARE

## 2023-06-21 VITALS
TEMPERATURE: 98 F | HEART RATE: 108 BPM | SYSTOLIC BLOOD PRESSURE: 173 MMHG | OXYGEN SATURATION: 96 % | DIASTOLIC BLOOD PRESSURE: 117 MMHG | RESPIRATION RATE: 18 BRPM

## 2023-06-21 DIAGNOSIS — K43.5 PARASTOMAL HERNIA WITHOUT OBSTRUCTION OR GANGRENE: Chronic | ICD-10-CM

## 2023-06-21 DIAGNOSIS — K56.609 UNSPECIFIED INTESTINAL OBSTRUCTION, UNSPECIFIED AS TO PARTIAL VERSUS COMPLETE OBSTRUCTION: ICD-10-CM

## 2023-06-21 LAB
ALBUMIN SERPL ELPH-MCNC: 4.5 G/DL — SIGNIFICANT CHANGE UP (ref 3.3–5)
ALP SERPL-CCNC: 97 U/L — SIGNIFICANT CHANGE UP (ref 40–120)
ALT FLD-CCNC: 18 U/L — SIGNIFICANT CHANGE UP (ref 4–33)
ANION GAP SERPL CALC-SCNC: 14 MMOL/L — SIGNIFICANT CHANGE UP (ref 7–14)
APPEARANCE UR: CLEAR — SIGNIFICANT CHANGE UP
APTT BLD: 27.5 SEC — SIGNIFICANT CHANGE UP (ref 27–36.3)
AST SERPL-CCNC: 28 U/L — SIGNIFICANT CHANGE UP (ref 4–32)
BASOPHILS # BLD AUTO: 0.01 K/UL — SIGNIFICANT CHANGE UP (ref 0–0.2)
BASOPHILS NFR BLD AUTO: 0.2 % — SIGNIFICANT CHANGE UP (ref 0–2)
BILIRUB SERPL-MCNC: 0.8 MG/DL — SIGNIFICANT CHANGE UP (ref 0.2–1.2)
BILIRUB UR-MCNC: NEGATIVE — SIGNIFICANT CHANGE UP
BLD GP AB SCN SERPL QL: NEGATIVE — SIGNIFICANT CHANGE UP
BLOOD GAS VENOUS COMPREHENSIVE RESULT: SIGNIFICANT CHANGE UP
BUN SERPL-MCNC: 14 MG/DL — SIGNIFICANT CHANGE UP (ref 7–23)
CALCIUM SERPL-MCNC: 10.3 MG/DL — SIGNIFICANT CHANGE UP (ref 8.4–10.5)
CHLORIDE SERPL-SCNC: 101 MMOL/L — SIGNIFICANT CHANGE UP (ref 98–107)
CO2 SERPL-SCNC: 23 MMOL/L — SIGNIFICANT CHANGE UP (ref 22–31)
COLOR SPEC: SIGNIFICANT CHANGE UP
CREAT SERPL-MCNC: 0.73 MG/DL — SIGNIFICANT CHANGE UP (ref 0.5–1.3)
DIFF PNL FLD: NEGATIVE — SIGNIFICANT CHANGE UP
EGFR: 82 ML/MIN/1.73M2 — SIGNIFICANT CHANGE UP
EOSINOPHIL # BLD AUTO: 0 K/UL — SIGNIFICANT CHANGE UP (ref 0–0.5)
EOSINOPHIL NFR BLD AUTO: 0 % — SIGNIFICANT CHANGE UP (ref 0–6)
GLUCOSE SERPL-MCNC: 127 MG/DL — HIGH (ref 70–99)
GLUCOSE UR QL: NEGATIVE — SIGNIFICANT CHANGE UP
HCT VFR BLD CALC: 46.6 % — HIGH (ref 34.5–45)
HGB BLD-MCNC: 14.5 G/DL — SIGNIFICANT CHANGE UP (ref 11.5–15.5)
IANC: 3.91 K/UL — SIGNIFICANT CHANGE UP (ref 1.8–7.4)
IMM GRANULOCYTES NFR BLD AUTO: 0.2 % — SIGNIFICANT CHANGE UP (ref 0–0.9)
INR BLD: 1.13 RATIO — SIGNIFICANT CHANGE UP (ref 0.88–1.16)
KETONES UR-MCNC: ABNORMAL
LACTATE BLDV-MCNC: 2 MMOL/L — SIGNIFICANT CHANGE UP (ref 0.5–2)
LEUKOCYTE ESTERASE UR-ACNC: NEGATIVE — SIGNIFICANT CHANGE UP
LIDOCAIN IGE QN: 20 U/L — SIGNIFICANT CHANGE UP (ref 7–60)
LYMPHOCYTES # BLD AUTO: 0.68 K/UL — LOW (ref 1–3.3)
LYMPHOCYTES # BLD AUTO: 14.1 % — SIGNIFICANT CHANGE UP (ref 13–44)
MCHC RBC-ENTMCNC: 24.6 PG — LOW (ref 27–34)
MCHC RBC-ENTMCNC: 31.1 GM/DL — LOW (ref 32–36)
MCV RBC AUTO: 79 FL — LOW (ref 80–100)
MONOCYTES # BLD AUTO: 0.22 K/UL — SIGNIFICANT CHANGE UP (ref 0–0.9)
MONOCYTES NFR BLD AUTO: 4.6 % — SIGNIFICANT CHANGE UP (ref 2–14)
NEUTROPHILS # BLD AUTO: 3.91 K/UL — SIGNIFICANT CHANGE UP (ref 1.8–7.4)
NEUTROPHILS NFR BLD AUTO: 80.9 % — HIGH (ref 43–77)
NITRITE UR-MCNC: NEGATIVE — SIGNIFICANT CHANGE UP
NRBC # BLD: 0 /100 WBCS — SIGNIFICANT CHANGE UP (ref 0–0)
NRBC # FLD: 0 K/UL — SIGNIFICANT CHANGE UP (ref 0–0)
PH UR: 7.5 — SIGNIFICANT CHANGE UP (ref 5–8)
PLATELET # BLD AUTO: 291 K/UL — SIGNIFICANT CHANGE UP (ref 150–400)
POTASSIUM SERPL-MCNC: 4.3 MMOL/L — SIGNIFICANT CHANGE UP (ref 3.5–5.3)
POTASSIUM SERPL-SCNC: 4.3 MMOL/L — SIGNIFICANT CHANGE UP (ref 3.5–5.3)
PROT SERPL-MCNC: 9.2 G/DL — HIGH (ref 6–8.3)
PROT UR-MCNC: ABNORMAL
PROTHROM AB SERPL-ACNC: 13.1 SEC — SIGNIFICANT CHANGE UP (ref 10.5–13.4)
RBC # BLD: 5.9 M/UL — HIGH (ref 3.8–5.2)
RBC # FLD: 14.1 % — SIGNIFICANT CHANGE UP (ref 10.3–14.5)
RH IG SCN BLD-IMP: POSITIVE — SIGNIFICANT CHANGE UP
SODIUM SERPL-SCNC: 138 MMOL/L — SIGNIFICANT CHANGE UP (ref 135–145)
SP GR SPEC: 1.01 — SIGNIFICANT CHANGE UP (ref 1.01–1.05)
UROBILINOGEN FLD QL: SIGNIFICANT CHANGE UP
WBC # BLD: 4.83 K/UL — SIGNIFICANT CHANGE UP (ref 3.8–10.5)
WBC # FLD AUTO: 4.83 K/UL — SIGNIFICANT CHANGE UP (ref 3.8–10.5)

## 2023-06-21 PROCEDURE — G1004: CPT

## 2023-06-21 PROCEDURE — 99285 EMERGENCY DEPT VISIT HI MDM: CPT | Mod: 25

## 2023-06-21 PROCEDURE — 71045 X-RAY EXAM CHEST 1 VIEW: CPT | Mod: 26,76

## 2023-06-21 PROCEDURE — 74177 CT ABD & PELVIS W/CONTRAST: CPT | Mod: 26,ME

## 2023-06-21 PROCEDURE — 43753 TX GASTRO INTUB W/ASP: CPT

## 2023-06-21 PROCEDURE — 99222 1ST HOSP IP/OBS MODERATE 55: CPT | Mod: GC

## 2023-06-21 RX ORDER — OMEPRAZOLE 10 MG/1
1 CAPSULE, DELAYED RELEASE ORAL
Qty: 0 | Refills: 0 | DISCHARGE

## 2023-06-21 RX ORDER — ONDANSETRON 8 MG/1
4 TABLET, FILM COATED ORAL ONCE
Refills: 0 | Status: COMPLETED | OUTPATIENT
Start: 2023-06-21 | End: 2023-06-21

## 2023-06-21 RX ORDER — LABETALOL HCL 100 MG
1 TABLET ORAL
Refills: 0 | DISCHARGE

## 2023-06-21 RX ORDER — METOPROLOL TARTRATE 50 MG
5 TABLET ORAL EVERY 6 HOURS
Refills: 0 | Status: DISCONTINUED | OUTPATIENT
Start: 2023-06-21 | End: 2023-06-25

## 2023-06-21 RX ORDER — SODIUM CHLORIDE 9 MG/ML
1000 INJECTION INTRAMUSCULAR; INTRAVENOUS; SUBCUTANEOUS ONCE
Refills: 0 | Status: COMPLETED | OUTPATIENT
Start: 2023-06-21 | End: 2023-06-21

## 2023-06-21 RX ORDER — SODIUM CHLORIDE 9 MG/ML
1000 INJECTION, SOLUTION INTRAVENOUS
Refills: 0 | Status: DISCONTINUED | OUTPATIENT
Start: 2023-06-21 | End: 2023-06-26

## 2023-06-21 RX ORDER — ACETAMINOPHEN 500 MG
1000 TABLET ORAL ONCE
Refills: 0 | Status: COMPLETED | OUTPATIENT
Start: 2023-06-21 | End: 2023-06-21

## 2023-06-21 RX ORDER — HEPARIN SODIUM 5000 [USP'U]/ML
5000 INJECTION INTRAVENOUS; SUBCUTANEOUS EVERY 8 HOURS
Refills: 0 | Status: DISCONTINUED | OUTPATIENT
Start: 2023-06-21 | End: 2023-06-26

## 2023-06-21 RX ORDER — LABETALOL HCL 100 MG
1 TABLET ORAL
Qty: 0 | Refills: 0 | DISCHARGE

## 2023-06-21 RX ORDER — IOHEXOL 300 MG/ML
30 INJECTION, SOLUTION INTRAVENOUS ONCE
Refills: 0 | Status: COMPLETED | OUTPATIENT
Start: 2023-06-21 | End: 2023-06-21

## 2023-06-21 RX ADMIN — Medication 5 MILLIGRAM(S): at 17:48

## 2023-06-21 RX ADMIN — SODIUM CHLORIDE 100 MILLILITER(S): 9 INJECTION, SOLUTION INTRAVENOUS at 22:56

## 2023-06-21 RX ADMIN — ONDANSETRON 4 MILLIGRAM(S): 8 TABLET, FILM COATED ORAL at 10:28

## 2023-06-21 RX ADMIN — Medication 400 MILLIGRAM(S): at 22:56

## 2023-06-21 RX ADMIN — Medication 5 MILLIGRAM(S): at 23:00

## 2023-06-21 RX ADMIN — Medication 1000 MILLIGRAM(S): at 23:26

## 2023-06-21 RX ADMIN — SODIUM CHLORIDE 100 MILLILITER(S): 9 INJECTION, SOLUTION INTRAVENOUS at 17:40

## 2023-06-21 RX ADMIN — IOHEXOL 30 MILLILITER(S): 300 INJECTION, SOLUTION INTRAVENOUS at 10:28

## 2023-06-21 RX ADMIN — SODIUM CHLORIDE 1000 MILLILITER(S): 9 INJECTION INTRAMUSCULAR; INTRAVENOUS; SUBCUTANEOUS at 10:31

## 2023-06-21 RX ADMIN — HEPARIN SODIUM 5000 UNIT(S): 5000 INJECTION INTRAVENOUS; SUBCUTANEOUS at 22:57

## 2023-06-21 NOTE — H&P ADULT - ASSESSMENT
83 year old female with recurrent SBO.    Plan:  - Admit to Acute Care Surgery/B Team, Dr. Lim  - NPO  - IV fluid resuscitation  - NGT placed by ED, f/u XR to verify placement  - Serial abdominal exams    Discussed with acute care surgery attending on call, Dr. Lim.      B Team Surgery  [id number] 83 year old female with recurrent SBO.    Plan:  - Admit to Acute Care Surgery/B Team, Dr. Lim  - NPO  - IV fluid resuscitation  - NGT placed by ED, f/u XR to verify placement; place to LCWS  - Serial abdominal exams    Discussed with acute care surgery attending on call, Dr. Lim.      B Team Surgery  [id number]

## 2023-06-21 NOTE — H&P ADULT - ATTENDING COMMENTS
Pt seen and examined.  Agree with resident eval and plan.  Imp:  Small bowel obstruction.  NGT decompression and IV hydration.  Gastrograffin challenge.

## 2023-06-21 NOTE — ED PROVIDER NOTE - ATTENDING CONTRIBUTION TO CARE
72-year-old female with history of diabetes, hypertension, hyperlipidemia, recent right knee replacement in February 2023 presents to emergency room for right leg swelling.  Saw patient's orthopedist yesterday and was advised to come to ER to rule out DVT.  Patient states has noticed increased swelling over the last 3 days to lower extremity.  At triage noted chest pain and shortness of breath, when prompted here denies both chest pain and shortness of breath.  Major complaint is swelling in the lower extremity which has improved since elevation last night.  Patient denies any prior history of DVT/PE.  Again denies chest pain and shortness of breath on my examination and history taking.  Physical exam  Well-appearing  Vital signs stable, not hypoxic, not tachycardic  Lungs clear to auscultation bilaterally  S1-S2 no murmurs rubs or gallops  Abdomen soft nontender nondistended  Extremities midline knee incision/scar clean dry and intact, 1+ pitting edema more concentrated around ankle and dorsum of foot, no calf tenderness  Agree with fellow note  83-year-old female with past medical history of diverticulitis status post subtotal colectomy in August 2019, multiple small bowel obstructions unclear from daughter if required surgical intervention presents with nausea vomiting and epigastric pain that is identical to prior small bowel obstructions.  Patient denies blood or bile in her emesis.  Denies fevers.  Physical exam  Mildly uncomfortable but nontoxic-appearing  Vital signs show tachycardia 108, afebrile, hypertensive  Dry oral mucosa  Clear to auscultation bilaterally  S1-S2 no murmurs rubs or gallops  Abdomen soft diffusely tender  Extremities no edema  Impression  Rule out small bowel obstruction we will get preop labs, CT abdomen pelvis and reassess

## 2023-06-21 NOTE — H&P ADULT - NSHPLABSRESULTS_GEN_ALL_CORE
14.5   4.83  )-----------( 291      ( 21 Jun 2023 10:25 )             46.6     06-21    138  |  101  |  14  ----------------------------<  127<H>  4.3   |  23  |  0.73    Ca    10.3      21 Jun 2023 10:25    TPro  9.2<H>  /  Alb  4.5  /  TBili  0.8  /  DBili  x   /  AST  28  /  ALT  18  /  AlkPhos  97  06-21    PT/INR - ( 21 Jun 2023 10:25 )   PT: 13.1 sec;   INR: 1.13 ratio         PTT - ( 21 Jun 2023 10:25 )  PTT:27.5 sec        IMAGING:  < from: CT Abdomen and Pelvis w/ Oral Cont and w/ IV Cont (06.21.23 @ 12:41) >      ACC: [id number] EXAM:  CT ABDOMEN AND PELVIS OC IC   ORDERED BY: RUFINO DURÁN     PROCEDURE DATE:  06/21/2023          INTERPRETATION:  CLINICAL INFORMATION: Status post colectomy with   multiple small bowel obstructions. Nausea, vomiting, epigastric pain.   Bowel obstruction suspected.    COMPARISON: CT abdomen pelvis 8/19/2022.    CONTRAST/COMPLICATIONS:  IV Contrast: Omnipaque 350  90 cc administered   10 cc discarded  Oral Contrast: Omnipaque 300   Fruit 2o  Complications: None reported at time of study completion    PROCEDURE:  CT of the Abdomen and Pelvis was performed.  Sagittal and coronal reformats were performed.    FINDINGS:  LOWER CHEST: Within normal limits.    LIVER: Stable left hepatic lobe cyst and 3.5 cm indeterminate right  posterior hepatic lobe wedge shaped hypodense lesion.  BILE DUCTS: Normal caliber.  GALLBLADDER: Within normal limits.  SPLEEN: Within normal limits.  PANCREAS: Within normal limits.  ADRENALS: Within normal limits.  KIDNEYS/URETERS: No hydronephrosis. Bilateral renal cysts and   subcentimeter hypodensities too small to characterize.    BLADDER: Within normal limits.  REPRODUCTIVE ORGANS: A 1.6 cm right adnexal cyst is unchanged.    BOWEL: Multiple dilated loops of small bowel with transition to collapsed   loops in the anterior mid abdomen (301, 64). There is fecalization   proximal to the transition point. Status post subtotal colectomy.  PERITONEUM: Small volume ascites and interloop mesenteric edema.  VESSELS: Minimal atherosclerotic changes.  RETROPERITONEUM/LYMPH NODES: No lymphadenopathy.  ABDOMINAL WALL: Rectus diastases.  BONES: Degenerative changes.    IMPRESSION:  Small bowel obstruction with transition point in the anterior mid abdomen   with associated interloop mesenteric edema and small volume ascites.        --- End of Report ---            GAUTAM BLANC MD; Attending Radiologist  This document has been electronically signed. Jun 21 2023  1:35PM    < end of copied text >

## 2023-06-21 NOTE — ED PROVIDER NOTE - SEVERE SEPSIS ALERT DETAILS
DH: I have seen and evaluated this patient and do not believe the patient is septic at this time. I will continue to evaluate.

## 2023-06-21 NOTE — ED ADULT NURSE NOTE - OBJECTIVE STATEMENT
Received patient to room 5 for abdominal pain. A&o4, ambulatory, daughter at bedside to translate, stating has been vomiting since 5pm last night into this morning, no blood. Also endorsing epigastric pain, noted to be slight distended. Not tender to touch, appears uncomfortable, RR even and unlabored. Denies chest pain, SOB. hx of colon resection and colostomy reversal.  Pending MD bernal at this time.

## 2023-06-21 NOTE — H&P ADULT - NSHPPHYSICALEXAM_GEN_ALL_CORE
Gen: NAD  CV: Low grade tachycardic when evaluated  Resp: Nonlabored breathing on room air  Abd: Soft, mildly distended, mild epigastric tenderness to palpation  Ext: Warm

## 2023-06-21 NOTE — ED ADULT TRIAGE NOTE - CHIEF COMPLAINT QUOTE
Pt with past surgical history of colon removal and past history of SBOs after surgery, presents with vomiting and upper abd pain with SOB since last night. LBM last night.

## 2023-06-21 NOTE — ED PROVIDER NOTE - CLINICAL SUMMARY MEDICAL DECISION MAKING FREE TEXT BOX
Lan Loredo, PGY4: 83 year old female w/ subtotal colectomy and multiple SBOs p/w epigastric pain, n/v, c/f repeat SBO. DDx otherwise includes but not limited to: cholecystitis, pancreatitis, constipation. Plan for labs, CT A/P, UA, surgery consult PRN, reassess.

## 2023-06-21 NOTE — ED PROVIDER NOTE - NS ED ROS FT
GENERAL: no fever, no chills, no weight loss  EYES: no change in vision, no irritation, no discharge, no redness, no pain  HEENT: no trouble swallowing or speaking, no throat pain, no ear pain  CARDIAC: no chest pain, no palpitations   PULMONARY: no cough, no shortness of breath, no wheezing  GI: +abdominal pain, +nausea, +vomiting, no diarrhea, +constipation, no melena, no hematochezia, no hematemesis  : no changes in urination, no dysuria, no frequency, no hematuria, no discharge  SKIN: no rashes  NEURO: no headache, no numbness, no weakness  MSK: no joint pain, no muscle pain, no back pain, no calf pain

## 2023-06-21 NOTE — ED PROVIDER NOTE - PHYSICAL EXAMINATION
GENERAL: A&Ox4, non-toxic appearing, no acute distress  HEENT: NCAT, EOMI, oral mucosa moist, normal conjunctiva  RESP: CTAB, no respiratory distress, no wheezes/rhonchi/rales, speaking in full sentences  CV: RRR, no murmurs/rubs/gallops  ABDOMEN: soft, mild epigastric tenderness, mildly distended, no guarding, no rebound, decreased bowel sounds  MSK: no visible deformities  NEURO: no focal sensory or motor deficits, CN 2-12 grossly intact  SKIN: warm, normal color, well perfused, no rash  PSYCH: normal affect

## 2023-06-21 NOTE — ED ADULT NURSE REASSESSMENT NOTE - NSFALLHARMRISKINTERV_ED_ALL_ED

## 2023-06-21 NOTE — ED ADULT NURSE NOTE - NSFALLUNIVINTERV_ED_ALL_ED
Bed/Stretcher in lowest position, wheels locked, appropriate side rails in place/Call bell, personal items and telephone in reach/Instruct patient to call for assistance before getting out of bed/chair/stretcher/Non-slip footwear applied when patient is off stretcher/[place] to call system/Physically safe environment - no spills, clutter or unnecessary equipment/Purposeful proactive rounding/Room/bathroom lighting operational, light cord in reach

## 2023-06-21 NOTE — ED PROVIDER NOTE - OBJECTIVE STATEMENT
83-year-old female PMH diverticulitis s/p subtotal colectomy August 2019, multiple SBO's, hypertension, GERD, presents to the emergency department for nausea, vomiting, and epigastric pain.  Patient states she usually feels the symptoms when she is having a bowel obstruction.  Patient's had multiple episodes of nonbloody, nonbilious vomiting since last night.  She has been able to pass flatulence.  Last bowel movement was 6 PM last night.  Last SBO was August 2022.  She denies fever, chills, diarrhea, dysuria, or weakness.

## 2023-06-21 NOTE — H&P ADULT - HISTORY OF PRESENT ILLNESS
83 year old female PMHx significant for HTN presenting with abdominal pain + nausea/vomiting. Surgical history significant for subtotal colectomy in 2012. Had incarcerated incisional hernia in 2014 requiring ex-lap and repair with stratice mesh. Has history of multiple SBOs - all managed conservatively. Last SBO in August 2022. Presents with nausea and vomiting since last night. Last BM and flatus at 6PM yesterday.    In ED, CT imaging demonstrating concerns for SBO.

## 2023-06-21 NOTE — ED PROVIDER NOTE - PROGRESS NOTE DETAILS
Lan Loredo, PGY4: CT showing SBO with transition point in the anterior mid abdomen with mesenteric edema and small volume ascites.  Patient and daughter aware of findings.  Paged surgery resident who is currently in emergency in the OR and will return phone call. Lan Loredo, PGY4: NGT placed at request of surgery with +gastric contents. Surgery resident at bedside prior to procedure for their assessment - they will d/w attending and forward recommendations. CXR ordered post-NGT.

## 2023-06-21 NOTE — ED ADULT NURSE REASSESSMENT NOTE - NS ED NURSE REASSESS COMMENT FT1
Pt tolerating NGT, secured to left nare. Appears resting comfortably at this time, daughter at bedside. Pending bed assignment,

## 2023-06-22 LAB
ANION GAP SERPL CALC-SCNC: 14 MMOL/L — SIGNIFICANT CHANGE UP (ref 7–14)
BUN SERPL-MCNC: 20 MG/DL — SIGNIFICANT CHANGE UP (ref 7–23)
CALCIUM SERPL-MCNC: 9.8 MG/DL — SIGNIFICANT CHANGE UP (ref 8.4–10.5)
CHLORIDE SERPL-SCNC: 102 MMOL/L — SIGNIFICANT CHANGE UP (ref 98–107)
CO2 SERPL-SCNC: 27 MMOL/L — SIGNIFICANT CHANGE UP (ref 22–31)
CREAT SERPL-MCNC: 0.91 MG/DL — SIGNIFICANT CHANGE UP (ref 0.5–1.3)
CULTURE RESULTS: SIGNIFICANT CHANGE UP
EGFR: 63 ML/MIN/1.73M2 — SIGNIFICANT CHANGE UP
GLUCOSE SERPL-MCNC: 110 MG/DL — HIGH (ref 70–99)
HCT VFR BLD CALC: 46.1 % — HIGH (ref 34.5–45)
HGB BLD-MCNC: 14.5 G/DL — SIGNIFICANT CHANGE UP (ref 11.5–15.5)
MAGNESIUM SERPL-MCNC: 2.3 MG/DL — SIGNIFICANT CHANGE UP (ref 1.6–2.6)
MCHC RBC-ENTMCNC: 25.4 PG — LOW (ref 27–34)
MCHC RBC-ENTMCNC: 31.5 GM/DL — LOW (ref 32–36)
MCV RBC AUTO: 80.7 FL — SIGNIFICANT CHANGE UP (ref 80–100)
NRBC # BLD: 0 /100 WBCS — SIGNIFICANT CHANGE UP (ref 0–0)
NRBC # FLD: 0 K/UL — SIGNIFICANT CHANGE UP (ref 0–0)
PHOSPHATE SERPL-MCNC: 4 MG/DL — SIGNIFICANT CHANGE UP (ref 2.5–4.5)
PLATELET # BLD AUTO: 288 K/UL — SIGNIFICANT CHANGE UP (ref 150–400)
POTASSIUM SERPL-MCNC: 3.3 MMOL/L — LOW (ref 3.5–5.3)
POTASSIUM SERPL-SCNC: 3.3 MMOL/L — LOW (ref 3.5–5.3)
RBC # BLD: 5.71 M/UL — HIGH (ref 3.8–5.2)
RBC # FLD: 14.4 % — SIGNIFICANT CHANGE UP (ref 10.3–14.5)
SODIUM SERPL-SCNC: 143 MMOL/L — SIGNIFICANT CHANGE UP (ref 135–145)
SPECIMEN SOURCE: SIGNIFICANT CHANGE UP
WBC # BLD: 6.89 K/UL — SIGNIFICANT CHANGE UP (ref 3.8–10.5)
WBC # FLD AUTO: 6.89 K/UL — SIGNIFICANT CHANGE UP (ref 3.8–10.5)

## 2023-06-22 PROCEDURE — 99232 SBSQ HOSP IP/OBS MODERATE 35: CPT

## 2023-06-22 PROCEDURE — 74018 RADEX ABDOMEN 1 VIEW: CPT | Mod: 26,77

## 2023-06-22 PROCEDURE — 74018 RADEX ABDOMEN 1 VIEW: CPT | Mod: 26

## 2023-06-22 RX ORDER — DIATRIZOATE MEGLUMINE 180 MG/ML
60 INJECTION, SOLUTION INTRAVESICAL ONCE
Refills: 0 | Status: COMPLETED | OUTPATIENT
Start: 2023-06-22 | End: 2023-06-22

## 2023-06-22 RX ORDER — POTASSIUM CHLORIDE 20 MEQ
10 PACKET (EA) ORAL
Refills: 0 | Status: COMPLETED | OUTPATIENT
Start: 2023-06-22 | End: 2023-06-22

## 2023-06-22 RX ADMIN — Medication 5 MILLIGRAM(S): at 11:57

## 2023-06-22 RX ADMIN — SODIUM CHLORIDE 100 MILLILITER(S): 9 INJECTION, SOLUTION INTRAVENOUS at 17:32

## 2023-06-22 RX ADMIN — HEPARIN SODIUM 5000 UNIT(S): 5000 INJECTION INTRAVENOUS; SUBCUTANEOUS at 13:58

## 2023-06-22 RX ADMIN — Medication 100 MILLIEQUIVALENT(S): at 09:15

## 2023-06-22 RX ADMIN — Medication 5 MILLIGRAM(S): at 06:24

## 2023-06-22 RX ADMIN — SODIUM CHLORIDE 100 MILLILITER(S): 9 INJECTION, SOLUTION INTRAVENOUS at 09:15

## 2023-06-22 RX ADMIN — DIATRIZOATE MEGLUMINE 60 MILLILITER(S): 180 INJECTION, SOLUTION INTRAVESICAL at 09:15

## 2023-06-22 RX ADMIN — HEPARIN SODIUM 5000 UNIT(S): 5000 INJECTION INTRAVENOUS; SUBCUTANEOUS at 22:13

## 2023-06-22 RX ADMIN — HEPARIN SODIUM 5000 UNIT(S): 5000 INJECTION INTRAVENOUS; SUBCUTANEOUS at 06:24

## 2023-06-22 RX ADMIN — Medication 5 MILLIGRAM(S): at 17:32

## 2023-06-22 RX ADMIN — Medication 100 MILLIEQUIVALENT(S): at 11:51

## 2023-06-22 RX ADMIN — Medication 100 MILLIEQUIVALENT(S): at 10:33

## 2023-06-22 RX ADMIN — SODIUM CHLORIDE 100 MILLILITER(S): 9 INJECTION, SOLUTION INTRAVENOUS at 10:32

## 2023-06-22 NOTE — PROGRESS NOTE ADULT - ASSESSMENT
83 year old female with recurrent SBO. Confirmed placement of NGT. Still -/-.     Plan:  - Gastrograffin challenge today  - NGT to LCWS  - NPO/IVF  - Serial abdominal exams  - pain control PRN  - activity: OOB  - daily labs  - monitor vitals    B Team Surgery  [id number]

## 2023-06-22 NOTE — PATIENT PROFILE ADULT - FALL HARM RISK - RISK INTERVENTIONS
Assistance with ambulation/Communicate Fall Risk and Risk Factors to all staff, patient, and family/Reinforce activity limits and safety measures with patient and family/Visual Cue: Yellow wristband/Bed in lowest position, wheels locked, appropriate side rails in place/Call bell, personal items and telephone in reach/Instruct patient to call for assistance before getting out of bed or chair/Non-slip footwear when patient is out of bed/[place] to call system/Physically safe environment - no spills, clutter or unnecessary equipment/Purposeful Proactive Rounding/Room/bathroom lighting operational, light cord in reach

## 2023-06-23 LAB
ANION GAP SERPL CALC-SCNC: 15 MMOL/L — HIGH (ref 7–14)
BUN SERPL-MCNC: 24 MG/DL — HIGH (ref 7–23)
CALCIUM SERPL-MCNC: 9.2 MG/DL — SIGNIFICANT CHANGE UP (ref 8.4–10.5)
CHLORIDE SERPL-SCNC: 101 MMOL/L — SIGNIFICANT CHANGE UP (ref 98–107)
CO2 SERPL-SCNC: 27 MMOL/L — SIGNIFICANT CHANGE UP (ref 22–31)
CREAT SERPL-MCNC: 0.77 MG/DL — SIGNIFICANT CHANGE UP (ref 0.5–1.3)
EGFR: 77 ML/MIN/1.73M2 — SIGNIFICANT CHANGE UP
GLUCOSE SERPL-MCNC: 87 MG/DL — SIGNIFICANT CHANGE UP (ref 70–99)
HCT VFR BLD CALC: 45.9 % — HIGH (ref 34.5–45)
HGB BLD-MCNC: 14.5 G/DL — SIGNIFICANT CHANGE UP (ref 11.5–15.5)
MAGNESIUM SERPL-MCNC: 2.2 MG/DL — SIGNIFICANT CHANGE UP (ref 1.6–2.6)
MCHC RBC-ENTMCNC: 25.6 PG — LOW (ref 27–34)
MCHC RBC-ENTMCNC: 31.6 GM/DL — LOW (ref 32–36)
MCV RBC AUTO: 81.1 FL — SIGNIFICANT CHANGE UP (ref 80–100)
NRBC # BLD: 0 /100 WBCS — SIGNIFICANT CHANGE UP (ref 0–0)
NRBC # FLD: 0 K/UL — SIGNIFICANT CHANGE UP (ref 0–0)
PHOSPHATE SERPL-MCNC: 2.7 MG/DL — SIGNIFICANT CHANGE UP (ref 2.5–4.5)
PLATELET # BLD AUTO: 256 K/UL — SIGNIFICANT CHANGE UP (ref 150–400)
POTASSIUM SERPL-MCNC: 3.3 MMOL/L — LOW (ref 3.5–5.3)
POTASSIUM SERPL-SCNC: 3.3 MMOL/L — LOW (ref 3.5–5.3)
RBC # BLD: 5.66 M/UL — HIGH (ref 3.8–5.2)
RBC # FLD: 14.1 % — SIGNIFICANT CHANGE UP (ref 10.3–14.5)
SODIUM SERPL-SCNC: 143 MMOL/L — SIGNIFICANT CHANGE UP (ref 135–145)
WBC # BLD: 6.25 K/UL — SIGNIFICANT CHANGE UP (ref 3.8–10.5)
WBC # FLD AUTO: 6.25 K/UL — SIGNIFICANT CHANGE UP (ref 3.8–10.5)

## 2023-06-23 PROCEDURE — 74018 RADEX ABDOMEN 1 VIEW: CPT | Mod: 26

## 2023-06-23 PROCEDURE — 99232 SBSQ HOSP IP/OBS MODERATE 35: CPT

## 2023-06-23 RX ORDER — POTASSIUM CHLORIDE 20 MEQ
10 PACKET (EA) ORAL
Refills: 0 | Status: COMPLETED | OUTPATIENT
Start: 2023-06-23 | End: 2023-06-23

## 2023-06-23 RX ADMIN — Medication 5 MILLIGRAM(S): at 11:35

## 2023-06-23 RX ADMIN — HEPARIN SODIUM 5000 UNIT(S): 5000 INJECTION INTRAVENOUS; SUBCUTANEOUS at 13:46

## 2023-06-23 RX ADMIN — HEPARIN SODIUM 5000 UNIT(S): 5000 INJECTION INTRAVENOUS; SUBCUTANEOUS at 05:16

## 2023-06-23 RX ADMIN — Medication 5 MILLIGRAM(S): at 23:49

## 2023-06-23 RX ADMIN — Medication 5 MILLIGRAM(S): at 05:16

## 2023-06-23 RX ADMIN — HEPARIN SODIUM 5000 UNIT(S): 5000 INJECTION INTRAVENOUS; SUBCUTANEOUS at 22:05

## 2023-06-23 RX ADMIN — Medication 5 MILLIGRAM(S): at 00:09

## 2023-06-23 RX ADMIN — Medication 100 MILLIEQUIVALENT(S): at 12:53

## 2023-06-23 RX ADMIN — Medication 100 MILLIEQUIVALENT(S): at 14:08

## 2023-06-23 RX ADMIN — Medication 100 MILLIEQUIVALENT(S): at 15:46

## 2023-06-23 RX ADMIN — Medication 5 MILLIGRAM(S): at 17:02

## 2023-06-23 NOTE — PROGRESS NOTE ADULT - ASSESSMENT
83 year old female with recurrent SBO. Confirmed placement of NGT. Still -/-. GG without progression of contrast.    Plan:  - NGT to LCWS  - NPO/IVF  - Serial abdominal exams  - pain control PRN  - activity: OOB  - daily labs  - monitor vitals    B Team  [id number]

## 2023-06-24 LAB
ANION GAP SERPL CALC-SCNC: 16 MMOL/L — HIGH (ref 7–14)
BUN SERPL-MCNC: 25 MG/DL — HIGH (ref 7–23)
CALCIUM SERPL-MCNC: 9.3 MG/DL — SIGNIFICANT CHANGE UP (ref 8.4–10.5)
CHLORIDE SERPL-SCNC: 103 MMOL/L — SIGNIFICANT CHANGE UP (ref 98–107)
CO2 SERPL-SCNC: 25 MMOL/L — SIGNIFICANT CHANGE UP (ref 22–31)
CREAT SERPL-MCNC: 0.74 MG/DL — SIGNIFICANT CHANGE UP (ref 0.5–1.3)
EGFR: 81 ML/MIN/1.73M2 — SIGNIFICANT CHANGE UP
GLUCOSE SERPL-MCNC: 84 MG/DL — SIGNIFICANT CHANGE UP (ref 70–99)
HCT VFR BLD CALC: 42.8 % — SIGNIFICANT CHANGE UP (ref 34.5–45)
HGB BLD-MCNC: 13.3 G/DL — SIGNIFICANT CHANGE UP (ref 11.5–15.5)
MAGNESIUM SERPL-MCNC: 2.1 MG/DL — SIGNIFICANT CHANGE UP (ref 1.6–2.6)
MCHC RBC-ENTMCNC: 25.3 PG — LOW (ref 27–34)
MCHC RBC-ENTMCNC: 31.1 GM/DL — LOW (ref 32–36)
MCV RBC AUTO: 81.4 FL — SIGNIFICANT CHANGE UP (ref 80–100)
NRBC # BLD: 0 /100 WBCS — SIGNIFICANT CHANGE UP (ref 0–0)
NRBC # FLD: 0 K/UL — SIGNIFICANT CHANGE UP (ref 0–0)
PHOSPHATE SERPL-MCNC: 2.4 MG/DL — LOW (ref 2.5–4.5)
PLATELET # BLD AUTO: 251 K/UL — SIGNIFICANT CHANGE UP (ref 150–400)
POTASSIUM SERPL-MCNC: 3.2 MMOL/L — LOW (ref 3.5–5.3)
POTASSIUM SERPL-SCNC: 3.2 MMOL/L — LOW (ref 3.5–5.3)
RBC # BLD: 5.26 M/UL — HIGH (ref 3.8–5.2)
RBC # FLD: 14.2 % — SIGNIFICANT CHANGE UP (ref 10.3–14.5)
SODIUM SERPL-SCNC: 144 MMOL/L — SIGNIFICANT CHANGE UP (ref 135–145)
WBC # BLD: 7.71 K/UL — SIGNIFICANT CHANGE UP (ref 3.8–10.5)
WBC # FLD AUTO: 7.71 K/UL — SIGNIFICANT CHANGE UP (ref 3.8–10.5)

## 2023-06-24 PROCEDURE — 99232 SBSQ HOSP IP/OBS MODERATE 35: CPT

## 2023-06-24 RX ORDER — POTASSIUM PHOSPHATE, MONOBASIC POTASSIUM PHOSPHATE, DIBASIC 236; 224 MG/ML; MG/ML
30 INJECTION, SOLUTION INTRAVENOUS ONCE
Refills: 0 | Status: COMPLETED | OUTPATIENT
Start: 2023-06-24 | End: 2023-06-24

## 2023-06-24 RX ORDER — HYDRALAZINE HCL 50 MG
10 TABLET ORAL ONCE
Refills: 0 | Status: COMPLETED | OUTPATIENT
Start: 2023-06-24 | End: 2023-06-24

## 2023-06-24 RX ORDER — POTASSIUM CHLORIDE 20 MEQ
10 PACKET (EA) ORAL
Refills: 0 | Status: COMPLETED | OUTPATIENT
Start: 2023-06-24 | End: 2023-06-24

## 2023-06-24 RX ADMIN — Medication 100 MILLIEQUIVALENT(S): at 12:46

## 2023-06-24 RX ADMIN — Medication 10 MILLIGRAM(S): at 11:06

## 2023-06-24 RX ADMIN — Medication 5 MILLIGRAM(S): at 23:23

## 2023-06-24 RX ADMIN — Medication 100 MILLIEQUIVALENT(S): at 11:13

## 2023-06-24 RX ADMIN — HEPARIN SODIUM 5000 UNIT(S): 5000 INJECTION INTRAVENOUS; SUBCUTANEOUS at 22:18

## 2023-06-24 RX ADMIN — HEPARIN SODIUM 5000 UNIT(S): 5000 INJECTION INTRAVENOUS; SUBCUTANEOUS at 05:57

## 2023-06-24 RX ADMIN — POTASSIUM PHOSPHATE, MONOBASIC POTASSIUM PHOSPHATE, DIBASIC 83.33 MILLIMOLE(S): 236; 224 INJECTION, SOLUTION INTRAVENOUS at 13:35

## 2023-06-24 RX ADMIN — Medication 5 MILLIGRAM(S): at 05:57

## 2023-06-24 RX ADMIN — Medication 100 MILLIEQUIVALENT(S): at 10:13

## 2023-06-24 RX ADMIN — Medication 5 MILLIGRAM(S): at 18:07

## 2023-06-24 RX ADMIN — HEPARIN SODIUM 5000 UNIT(S): 5000 INJECTION INTRAVENOUS; SUBCUTANEOUS at 15:06

## 2023-06-24 RX ADMIN — Medication 5 MILLIGRAM(S): at 12:39

## 2023-06-24 NOTE — PROGRESS NOTE ADULT - ASSESSMENT
83 year old female with recurrent SBO. Confirmed placement of NGT. Still -/-. GG without progression of contrast. Had a BM on 6/23.    Plan:  - NGT to gravity  - NPO/IVF  - Serial abdominal exams  - pain control PRN  - activity: OOB  - daily labs    B Team  [id number]

## 2023-06-25 LAB
ANION GAP SERPL CALC-SCNC: 15 MMOL/L — HIGH (ref 7–14)
BUN SERPL-MCNC: 15 MG/DL — SIGNIFICANT CHANGE UP (ref 7–23)
CALCIUM SERPL-MCNC: 8.8 MG/DL — SIGNIFICANT CHANGE UP (ref 8.4–10.5)
CHLORIDE SERPL-SCNC: 99 MMOL/L — SIGNIFICANT CHANGE UP (ref 98–107)
CO2 SERPL-SCNC: 21 MMOL/L — LOW (ref 22–31)
CREAT SERPL-MCNC: 0.62 MG/DL — SIGNIFICANT CHANGE UP (ref 0.5–1.3)
EGFR: 89 ML/MIN/1.73M2 — SIGNIFICANT CHANGE UP
GLUCOSE SERPL-MCNC: 65 MG/DL — LOW (ref 70–99)
HCT VFR BLD CALC: 40.7 % — SIGNIFICANT CHANGE UP (ref 34.5–45)
HGB BLD-MCNC: 12.8 G/DL — SIGNIFICANT CHANGE UP (ref 11.5–15.5)
MAGNESIUM SERPL-MCNC: 1.9 MG/DL — SIGNIFICANT CHANGE UP (ref 1.6–2.6)
MCHC RBC-ENTMCNC: 25.1 PG — LOW (ref 27–34)
MCHC RBC-ENTMCNC: 31.4 GM/DL — LOW (ref 32–36)
MCV RBC AUTO: 80 FL — SIGNIFICANT CHANGE UP (ref 80–100)
NRBC # BLD: 0 /100 WBCS — SIGNIFICANT CHANGE UP (ref 0–0)
NRBC # FLD: 0 K/UL — SIGNIFICANT CHANGE UP (ref 0–0)
PHOSPHATE SERPL-MCNC: 2.1 MG/DL — LOW (ref 2.5–4.5)
PLATELET # BLD AUTO: 228 K/UL — SIGNIFICANT CHANGE UP (ref 150–400)
POTASSIUM SERPL-MCNC: 3.1 MMOL/L — LOW (ref 3.5–5.3)
POTASSIUM SERPL-SCNC: 3.1 MMOL/L — LOW (ref 3.5–5.3)
RBC # BLD: 5.09 M/UL — SIGNIFICANT CHANGE UP (ref 3.8–5.2)
RBC # FLD: 13.8 % — SIGNIFICANT CHANGE UP (ref 10.3–14.5)
SODIUM SERPL-SCNC: 135 MMOL/L — SIGNIFICANT CHANGE UP (ref 135–145)
WBC # BLD: 6.53 K/UL — SIGNIFICANT CHANGE UP (ref 3.8–10.5)
WBC # FLD AUTO: 6.53 K/UL — SIGNIFICANT CHANGE UP (ref 3.8–10.5)

## 2023-06-25 RX ORDER — MAGNESIUM SULFATE 500 MG/ML
1 VIAL (ML) INJECTION ONCE
Refills: 0 | Status: COMPLETED | OUTPATIENT
Start: 2023-06-25 | End: 2023-06-25

## 2023-06-25 RX ORDER — POTASSIUM CHLORIDE 20 MEQ
20 PACKET (EA) ORAL
Refills: 0 | Status: COMPLETED | OUTPATIENT
Start: 2023-06-25 | End: 2023-06-25

## 2023-06-25 RX ORDER — LABETALOL HCL 100 MG
100 TABLET ORAL EVERY 12 HOURS
Refills: 0 | Status: DISCONTINUED | OUTPATIENT
Start: 2023-06-25 | End: 2023-06-26

## 2023-06-25 RX ORDER — SODIUM,POTASSIUM PHOSPHATES 278-250MG
2 POWDER IN PACKET (EA) ORAL ONCE
Refills: 0 | Status: COMPLETED | OUTPATIENT
Start: 2023-06-25 | End: 2023-06-25

## 2023-06-25 RX ADMIN — Medication 20 MILLIEQUIVALENT(S): at 12:04

## 2023-06-25 RX ADMIN — Medication 20 MILLIEQUIVALENT(S): at 16:12

## 2023-06-25 RX ADMIN — Medication 100 MILLIGRAM(S): at 17:41

## 2023-06-25 RX ADMIN — Medication 2 PACKET(S): at 12:05

## 2023-06-25 RX ADMIN — Medication 20 MILLIEQUIVALENT(S): at 13:54

## 2023-06-25 RX ADMIN — Medication 85 MILLIMOLE(S): at 13:47

## 2023-06-25 RX ADMIN — HEPARIN SODIUM 5000 UNIT(S): 5000 INJECTION INTRAVENOUS; SUBCUTANEOUS at 05:25

## 2023-06-25 RX ADMIN — Medication 5 MILLIGRAM(S): at 05:25

## 2023-06-25 RX ADMIN — Medication 100 GRAM(S): at 12:05

## 2023-06-25 RX ADMIN — HEPARIN SODIUM 5000 UNIT(S): 5000 INJECTION INTRAVENOUS; SUBCUTANEOUS at 13:46

## 2023-06-25 RX ADMIN — HEPARIN SODIUM 5000 UNIT(S): 5000 INJECTION INTRAVENOUS; SUBCUTANEOUS at 21:59

## 2023-06-25 NOTE — PROGRESS NOTE ADULT - ATTENDING COMMENTS
Pt is a 26year old female admitted to TRG3/TRG3-A. Patient presents with:  Medical Complication: Right sided pain,, sometimes travels to back     Pt is  33w5d intra-uterine pregnancy. History obtained, consents signed.  Oriented to room, staff, a
The patient was seen and examined, chart and notes reviewed.  The current diagnosis, plan of care and alternatives have been discussed with the patient.  All questions have been answered and updates have been discussed.  The case was discussed with B team residents/PA's and medical students at morning B surgical rounds and throughout the course of the day.    Small bowel obstruction  a.  With noted flatus/BM overnight  b.  NGT with 1.3l/24 hrs  c.  Remains distended  d.  No contrast seen on initial [facility] abdominal xray  e.  Keep NPO  f.  Continue IVF resuscitation  g.  Await GI function  h.  Continue with cehmical DVT prophylaxis
Adhesive small bowel obstruction    a. No events overnight  b.  Keep nil per OS  c.  Maintain IVF resuscitation  d.  May place NGT to low wall suction  e.  Obtain CBC, BMP q 24 and as needed  f.  Maintain DVT prophylaxis with Venodyne / heparin SQ/ lovenox  G.  CT of abdomen and pelvis/plain abdominal films reviewed  f.  Once decompressed, may perform water soluble contrast challenge per ACS protocol  g.  Strict I and O's
SBO  a.  With noted flatus/BM  b.  Wean IVF  c.   May have clears  d.  Await further GI function  e.  Continue chemical DVT prophylaxis
Pt seen and examined with surgical house staff.  Agree with assessment and plan.

## 2023-06-25 NOTE — PROGRESS NOTE ADULT - ASSESSMENT
83 year old female with recurrent SBO. Confirmed placement of NGT. Still -/-. GG without progression of contrast. Had a BM on 6/23.    Plan:  - CLD  - Serial abdominal exams  - pain control PRN  - activity: OOB  - daily labs    B Team  [id number] 83 year old female with recurrent SBO. Confirmed placement of NGT. Still -/-. GG without progression of contrast. Had a BM on 6/23.    Plan:  - CLD, advance to LRD for dinner if tolerating today  - PO meds  - Serial abdominal exams  - pain control PRN  - activity: OOB  - daily labs    B Team  [id number]

## 2023-06-26 ENCOUNTER — TRANSCRIPTION ENCOUNTER (OUTPATIENT)
Age: 83
End: 2023-06-26

## 2023-06-26 VITALS
OXYGEN SATURATION: 99 % | HEART RATE: 91 BPM | SYSTOLIC BLOOD PRESSURE: 151 MMHG | TEMPERATURE: 99 F | RESPIRATION RATE: 18 BRPM | DIASTOLIC BLOOD PRESSURE: 93 MMHG

## 2023-06-26 LAB
ANION GAP SERPL CALC-SCNC: 16 MMOL/L — HIGH (ref 7–14)
BUN SERPL-MCNC: 10 MG/DL — SIGNIFICANT CHANGE UP (ref 7–23)
CALCIUM SERPL-MCNC: 9 MG/DL — SIGNIFICANT CHANGE UP (ref 8.4–10.5)
CHLORIDE SERPL-SCNC: 99 MMOL/L — SIGNIFICANT CHANGE UP (ref 98–107)
CO2 SERPL-SCNC: 22 MMOL/L — SIGNIFICANT CHANGE UP (ref 22–31)
CREAT SERPL-MCNC: 0.73 MG/DL — SIGNIFICANT CHANGE UP (ref 0.5–1.3)
EGFR: 82 ML/MIN/1.73M2 — SIGNIFICANT CHANGE UP
GLUCOSE SERPL-MCNC: 89 MG/DL — SIGNIFICANT CHANGE UP (ref 70–99)
HCT VFR BLD CALC: 39.8 % — SIGNIFICANT CHANGE UP (ref 34.5–45)
HGB BLD-MCNC: 12.8 G/DL — SIGNIFICANT CHANGE UP (ref 11.5–15.5)
MAGNESIUM SERPL-MCNC: 2.1 MG/DL — SIGNIFICANT CHANGE UP (ref 1.6–2.6)
MCHC RBC-ENTMCNC: 25.5 PG — LOW (ref 27–34)
MCHC RBC-ENTMCNC: 32.2 GM/DL — SIGNIFICANT CHANGE UP (ref 32–36)
MCV RBC AUTO: 79.3 FL — LOW (ref 80–100)
NRBC # BLD: 0 /100 WBCS — SIGNIFICANT CHANGE UP (ref 0–0)
NRBC # FLD: 0 K/UL — SIGNIFICANT CHANGE UP (ref 0–0)
PHOSPHATE SERPL-MCNC: 3.5 MG/DL — SIGNIFICANT CHANGE UP (ref 2.5–4.5)
PLATELET # BLD AUTO: 222 K/UL — SIGNIFICANT CHANGE UP (ref 150–400)
POTASSIUM SERPL-MCNC: 3.3 MMOL/L — LOW (ref 3.5–5.3)
POTASSIUM SERPL-SCNC: 3.3 MMOL/L — LOW (ref 3.5–5.3)
RBC # BLD: 5.02 M/UL — SIGNIFICANT CHANGE UP (ref 3.8–5.2)
RBC # FLD: 14 % — SIGNIFICANT CHANGE UP (ref 10.3–14.5)
SODIUM SERPL-SCNC: 137 MMOL/L — SIGNIFICANT CHANGE UP (ref 135–145)
WBC # BLD: 5.07 K/UL — SIGNIFICANT CHANGE UP (ref 3.8–10.5)
WBC # FLD AUTO: 5.07 K/UL — SIGNIFICANT CHANGE UP (ref 3.8–10.5)

## 2023-06-26 RX ORDER — POTASSIUM CHLORIDE 20 MEQ
40 PACKET (EA) ORAL ONCE
Refills: 0 | Status: COMPLETED | OUTPATIENT
Start: 2023-06-26 | End: 2023-06-26

## 2023-06-26 RX ADMIN — HEPARIN SODIUM 5000 UNIT(S): 5000 INJECTION INTRAVENOUS; SUBCUTANEOUS at 14:14

## 2023-06-26 RX ADMIN — SODIUM CHLORIDE 60 MILLILITER(S): 9 INJECTION, SOLUTION INTRAVENOUS at 12:00

## 2023-06-26 RX ADMIN — Medication 40 MILLIEQUIVALENT(S): at 12:00

## 2023-06-26 RX ADMIN — HEPARIN SODIUM 5000 UNIT(S): 5000 INJECTION INTRAVENOUS; SUBCUTANEOUS at 05:46

## 2023-06-26 RX ADMIN — Medication 100 MILLIGRAM(S): at 05:46

## 2023-06-26 NOTE — PROVIDER CONTACT NOTE (OTHER) - ACTION/TREATMENT ORDERED:
continue monitoring, no orders.
provider notified, no further actions ordered
apply cold packs and repeat temp in 15 min.

## 2023-06-26 NOTE — DISCHARGE NOTE PROVIDER - NSDCFUADDINST_GEN_ALL_CORE_FT
BATHING: You may shower and/or sponge bathe. You may use warm soapy water in the shower and rinse, pat dry.  ACTIVITY: No heavy lifting or straining. Otherwise, you may return to your usual level of physical activity. If you are taking narcotic pain medication DO NOT drive a car, operate machinery or make important decisions.  DIET: Return to your usual diet.  NOTIFY YOUR SURGEON IF YOU HAVE: any bleeding that does not stop, any pus draining from your wound(s), any fever (over 100.4 F) persistent nausea/vomiting, or if your pain is not controlled on your discharge pain medications, unable to urinate.  Please follow up with your primary care physician in one week regarding your hospitalization, bring copies of your discharge paperwork.  Please follow up with your surgeon, Dr. Posada.

## 2023-06-26 NOTE — PROGRESS NOTE ADULT - SUBJECTIVE AND OBJECTIVE BOX
B Team Surgery Daily Progress Note    Subjective:   Patient seen at bedside this AM. Denies acute onset abdominal pain, N/V/D, fevers, chills, SOB, CP, lightheadedness. Still -/-.     24h Events:   - Overnight, no acute events    Objective:  Vital Signs  T(C): 37.1 (06-22 @ 06:00), Max: 37.3 (06-21 @ 22:00)  HR: 99 (06-22 @ 06:00) (82 - 99)  BP: 152/92 (06-22 @ 06:00) (144/96 - 182/107)  RR: 18 (06-22 @ 06:00) (16 - 18)  SpO2: 94% (06-22 @ 06:00) (94% - 100%)  06-21-23 @ 07:01  -  06-22-23 @ 07:00  --------------------------------------------------------  IN:  Total IN: 0 mL    OUT:    Voided (mL): 400 mL  Total OUT: 400 mL    Total NET: -400 mL      06-22-23 @ 07:01  -  06-22-23 @ 09:47  --------------------------------------------------------  IN:  Total IN: 0 mL    OUT:    Nasogastric/Oral tube (mL): 650 mL  Total OUT: 650 mL    Total NET: -650 mL    Physical Exam:  GEN: resting in bed comfortably in NAD  RESP: no increased WOB  ABD: soft, nontender, mildly distended  EXTR: warm, well-perfused, grossly normal      Labs:                        14.5   6.89  )-----------( 288      ( 22 Jun 2023 06:00 )             46.1   06-22    143  |  102  |  20  ----------------------------<  110<H>  3.3<L>   |  27  |  0.91    Ca    9.8      22 Jun 2023 06:00  Phos  4.0     06-22  Mg     2.30     06-22    TPro  9.2<H>  /  Alb  4.5  /  TBili  0.8  /  DBili  x   /  AST  28  /  ALT  18  /  AlkPhos  97  06-21    CAPILLARY BLOOD GLUCOSE          Medications:   MEDICATIONS  (STANDING):  heparin   Injectable 5000 Unit(s) SubCutaneous every 8 hours  lactated ringers. 1000 milliLiter(s) (100 mL/Hr) IV Continuous <Continuous>  metoprolol tartrate Injectable 5 milliGRAM(s) IV Push every 6 hours  potassium chloride  10 mEq/100 mL IVPB 10 milliEquivalent(s) IV Intermittent every 1 hour    MEDICATIONS  (PRN):      Imaging:      
GENERAL SURGERY PROGRESS NOTE    SUBJECTIVE  No acute issues overnight. Seen and examined on morning rounds.    10-point review of systems completed and negative except as noted above.      OBJECTIVE    MEDICATIONS  heparin   Injectable 5000 Unit(s) SubCutaneous every 8 hours  labetalol 100 milliGRAM(s) Oral every 12 hours  lactated ringers. 1000 milliLiter(s) IV Continuous <Continuous>      PHYSICAL EXAM  T(C): 37.4 (06-26-23 @ 06:00), Max: 38.4 (06-26-23 @ 01:55)  HR: 65 (06-26-23 @ 06:00) (65 - 95)  BP: 144/67 (06-26-23 @ 06:00) (142/87 - 159/92)  RR: 18 (06-26-23 @ 06:00) (16 - 18)  SpO2: 96% (06-26-23 @ 06:00) (96% - 98%)    06-25-23 @ 07:01  -  06-26-23 @ 07:00  --------------------------------------------------------  IN: 660 mL / OUT: 0 mL / NET: 660 mL      General: Appears well, NAD  Neuro: AAOx3  Respiratory: non-labored breathing on room air  CV: pulse present  Abdomen: soft, nontender, nondistended, no rebound or guarding  Extremities: Grossly symmetric      LABS                        12.8   5.07  )-----------( 222      ( 26 Jun 2023 05:20 )             39.8     06-26    137  |  99  |  10  ----------------------------<  89  3.3<L>   |  22  |  0.73    Ca    9.0      26 Jun 2023 05:20  Phos  3.5     06-26  Mg     2.10     06-26        Urinalysis Basic - ( 26 Jun 2023 05:20 )    Color: x / Appearance: x / SG: x / pH: x  Gluc: 89 mg/dL / Ketone: x  / Bili: x / Urobili: x   Blood: x / Protein: x / Nitrite: x   Leuk Esterase: x / RBC: x / WBC x   Sq Epi: x / Non Sq Epi: x / Bacteria: x        RADIOLOGY & ADDITIONAL STUDIES  
GENERAL SURGERY PROGRESS NOTE    SUBJECTIVE  No acute events overnight. Seen and examined on morning rounds.     10-point review of systems completed and negative except as noted above.      OBJECTIVE    MEDICATIONS  heparin   Injectable 5000 Unit(s) SubCutaneous every 8 hours  lactated ringers. 1000 milliLiter(s) IV Continuous <Continuous>  metoprolol tartrate Injectable 5 milliGRAM(s) IV Push every 6 hours      PHYSICAL EXAM  T(C): 37.3 (06-24-23 @ 22:00), Max: 37.3 (06-24-23 @ 22:00)  HR: 94 (06-24-23 @ 22:00) (80 - 104)  BP: 147/82 (06-24-23 @ 22:00) (137/89 - 188/88)  RR: 16 (06-24-23 @ 22:00) (16 - 18)  SpO2: 98% (06-24-23 @ 22:00) (94% - 100%)    06-23-23 @ 07:01  -  06-24-23 @ 07:00  --------------------------------------------------------  IN: 1900 mL / OUT: 601 mL / NET: 1299 mL    06-24-23 @ 07:01  -  06-25-23 @ 01:14  --------------------------------------------------------  IN: 0 mL / OUT: 52 mL / NET: -52 mL      General: Appears well, NAD  Neuro: AAOx3  Respiratory: non-labored breathing on room air  CV: pulse present  Abdomen: soft, nontender, nondistended, no rebound or guarding  Extremities: Grossly symmetric      LABS                        13.3   7.71  )-----------( 251      ( 24 Jun 2023 05:40 )             42.8     06-24    144  |  103  |  25<H>  ----------------------------<  84  3.2<L>   |  25  |  0.74    Ca    9.3      24 Jun 2023 05:40  Phos  2.4     06-24  Mg     2.10     06-24        Urinalysis Basic - ( 24 Jun 2023 05:40 )    Color: x / Appearance: x / SG: x / pH: x  Gluc: 84 mg/dL / Ketone: x  / Bili: x / Urobili: x   Blood: x / Protein: x / Nitrite: x   Leuk Esterase: x / RBC: x / WBC x   Sq Epi: x / Non Sq Epi: x / Bacteria: x        RADIOLOGY & ADDITIONAL STUDIES  
GENERAL SURGERY PROGRESS NOTE    SUBJECTIVE  Had a bowel movement overnight. Feeling well on morning rounds.     10-point review of systems completed and negative except as noted above.      OBJECTIVE    MEDICATIONS  heparin   Injectable 5000 Unit(s) SubCutaneous every 8 hours  lactated ringers. 1000 milliLiter(s) IV Continuous <Continuous>  metoprolol tartrate Injectable 5 milliGRAM(s) IV Push every 6 hours  potassium chloride  10 mEq/100 mL IVPB 10 milliEquivalent(s) IV Intermittent every 1 hour  potassium phosphate IVPB 30 milliMole(s) IV Intermittent once      PHYSICAL EXAM  T(C): 37.2 (06-24-23 @ 06:00), Max: 37.7 (06-23-23 @ 10:03)  HR: 81 (06-24-23 @ 06:00) (81 - 98)  BP: 149/92 (06-24-23 @ 07:25) (147/105 - 173/99)  RR: 18 (06-24-23 @ 06:00) (17 - 18)  SpO2: 95% (06-24-23 @ 06:00) (94% - 100%)    06-23-23 @ 07:01  -  06-24-23 @ 07:00  --------------------------------------------------------  IN: 1900 mL / OUT: 601 mL / NET: 1299 mL      General: Appears well, NAD  Neuro: AAOx3  Respiratory: non-labored breathing on room air  CV: pulse present  Abdomen: soft, nontender, nondistended, no rebound or guarding. NGT in place to LCWS  Extremities: Grossly symmetric    LABS                        13.3   7.71  )-----------( 251      ( 24 Jun 2023 05:40 )             42.8     06-24    144  |  103  |  25<H>  ----------------------------<  84  3.2<L>   |  25  |  0.74    Ca    9.3      24 Jun 2023 05:40  Phos  2.4     06-24  Mg     2.10     06-24        Urinalysis Basic - ( 24 Jun 2023 05:40 )    Color: x / Appearance: x / SG: x / pH: x  Gluc: 84 mg/dL / Ketone: x  / Bili: x / Urobili: x   Blood: x / Protein: x / Nitrite: x   Leuk Esterase: x / RBC: x / WBC x   Sq Epi: x / Non Sq Epi: x / Bacteria: x        RADIOLOGY & ADDITIONAL STUDIES  
GENERAL SURGERY PROGRESS NOTE    SUBJECTIVE  No acute events overnight. Seen and examined on morning rounds.     10-point review of systems completed and negative except as noted above.      OBJECTIVE    MEDICATIONS  heparin   Injectable 5000 Unit(s) SubCutaneous every 8 hours  lactated ringers. 1000 milliLiter(s) IV Continuous <Continuous>  metoprolol tartrate Injectable 5 milliGRAM(s) IV Push every 6 hours      PHYSICAL EXAM  T(C): 37.6 (06-23-23 @ 05:00), Max: 37.7 (06-23-23 @ 02:00)  HR: 90 (06-23-23 @ 05:00) (71 - 91)  BP: 160/90 (06-23-23 @ 05:00) (125/60 - 160/90)  RR: 18 (06-23-23 @ 05:00) (17 - 18)  SpO2: 94% (06-23-23 @ 05:00) (93% - 96%)    06-22-23 @ 07:01  -  06-23-23 @ 07:00  --------------------------------------------------------  IN: 2600 mL / OUT: 2301 mL / NET: 299 mL        Physical Exam:  GEN: resting in bed comfortably in NAD  RESP: no increased WOB  ABD: soft, nontender, mildly distended  EXTR: warm, well-perfused, grossly normal    LABS                        14.5   6.25  )-----------( 256      ( 23 Jun 2023 05:35 )             45.9     06-23    143  |  101  |  24<H>  ----------------------------<  87  3.3<L>   |  27  |  0.77    Ca    9.2      23 Jun 2023 05:35  Phos  2.7     06-23  Mg     2.20     06-23    TPro  9.2<H>  /  Alb  4.5  /  TBili  0.8  /  DBili  x   /  AST  28  /  ALT  18  /  AlkPhos  97  06-21    PT/INR - ( 21 Jun 2023 10:25 )   PT: 13.1 sec;   INR: 1.13 ratio         PTT - ( 21 Jun 2023 10:25 )  PTT:27.5 sec  Urinalysis Basic - ( 23 Jun 2023 05:35 )    Color: x / Appearance: x / SG: x / pH: x  Gluc: 87 mg/dL / Ketone: x  / Bili: x / Urobili: x   Blood: x / Protein: x / Nitrite: x   Leuk Esterase: x / RBC: x / WBC x   Sq Epi: x / Non Sq Epi: x / Bacteria: x        RADIOLOGY & ADDITIONAL STUDIES

## 2023-06-26 NOTE — DISCHARGE NOTE PROVIDER - NSDCCPCAREPLAN_GEN_ALL_CORE_FT
PRINCIPAL DISCHARGE DIAGNOSIS  Diagnosis: SBO (small bowel obstruction)  Assessment and Plan of Treatment: NGT, gastrogaffin challenge, bowel rest

## 2023-06-26 NOTE — PROGRESS NOTE PEDS - ASSESSMENT
83-year-old female with recurrent SBO. Confirmed placement of NGT. Still -/-. GG without progression of contrast. Had a BM on 6/23.    Plan:  - LRD  - PO meds  - Serial abdominal exams  - pain control PRN  - activity: OOB  - daily labs  - anticipate discharge today    B Team  [id number]

## 2023-06-26 NOTE — DISCHARGE NOTE NURSING/CASE MANAGEMENT/SOCIAL WORK - NSDCPNINST_GEN_ALL_CORE
notify provider if experience severe pain, fever (temperature is greater [id number]), chills, nausea, vomiting, active bleeding or shortness of breath. No driving while taking pain medication, it causes drowsiness & constipation. Drink 6-8 glasses of fluids daily to promote hydration. Follow up with the MD as per discharge instructions

## 2023-06-26 NOTE — DISCHARGE NOTE PROVIDER - HOSPITAL COURSE
83 year old female PMHx significant for HTN, subtotal colectomy (2012), incarcerated incisional hernia repair in 2014, multiple SBOs presenting with abdominal pain + nausea/vomiting. CT scan abdomen and pelvis showed: Small bowel obstruction with transition point in the anterior mid abdomen with associated interloop mesenteric edema and small volume ascites. NGT placed in the ED.  6/22: Gastrogaffin challenge, patient passed gas and had bowel movement, NGT put out 1100 overnight  6/24: NGT to gravity  6/25: NGT removed patient begun on a clear liquid diet  6/26: Patient advanced to regular diet, reports having bowel movement and having gas. Tolerated diet, feeling better    At the time of discharge, the patient was hemodynamically stable, was tolerating PO diet, was voiding urine and passing stool.  Patient was ambulating and was comfortable with adequate pain control.  The patient was instructed to follow up with Dr. Posada within 1 week after discharge from the hospital.  The patient / family felt comfortable with discharge.  The patient had no other issues.    Per attending, patient deemed medically stable and ready for discharge at this time.

## 2023-06-26 NOTE — DISCHARGE NOTE PROVIDER - CARE PROVIDER_API CALL
Sampson Posada  Surgical Critical Care  [phone] [place] [address] Level [place] [address]  Phone: [phone]  Fax: [phone]  Follow Up Time: 2 weeks

## 2023-06-26 NOTE — PROGRESS NOTE PEDS - SUBJECTIVE AND OBJECTIVE BOX
GENERAL SURGERY PROGRESS NOTE    SUBJECTIVE  No acute events overnight. Seen and examined on morning rounds.     10-point review of systems completed and negative except as noted above.      OBJECTIVE    MEDICATIONS  heparin   Injectable 5000 Unit(s) SubCutaneous every 8 hours  labetalol 100 milliGRAM(s) Oral every 12 hours  lactated ringers. 1000 milliLiter(s) IV Continuous <Continuous>      PHYSICAL EXAM  T(C): 37.4 (06-26-23 @ 06:00), Max: 38.4 (06-26-23 @ 01:55)  HR: 65 (06-26-23 @ 06:00) (65 - 95)  BP: 144/67 (06-26-23 @ 06:00) (142/87 - 159/92)  RR: 18 (06-26-23 @ 06:00) (16 - 18)  SpO2: 96% (06-26-23 @ 06:00) (96% - 98%)    06-25-23 @ 07:01  -  06-26-23 @ 07:00  --------------------------------------------------------  IN: 660 mL / OUT: 0 mL / NET: 660 mL    General: Appears well, NAD  Neuro: AAOx3  Respiratory: non-labored breathing on room air  CV: pulse present  Abdomen: soft, nontender, nondistended, no rebound or guarding  Extremities: Grossly symmetric      LABS                        12.8   5.07  )-----------( 222      ( 26 Jun 2023 05:20 )             39.8     06-26    137  |  99  |  10  ----------------------------<  89  3.3<L>   |  22  |  0.73    Ca    9.0      26 Jun 2023 05:20  Phos  3.5     06-26  Mg     2.10     06-26        Urinalysis Basic - ( 26 Jun 2023 05:20 )    Color: x / Appearance: x / SG: x / pH: x  Gluc: 89 mg/dL / Ketone: x  / Bili: x / Urobili: x   Blood: x / Protein: x / Nitrite: x   Leuk Esterase: x / RBC: x / WBC x   Sq Epi: x / Non Sq Epi: x / Bacteria: x        RADIOLOGY & ADDITIONAL STUDIES

## 2023-06-26 NOTE — PROVIDER CONTACT NOTE (OTHER) - SITUATION
patient hypertensive despite receiving IV metoprolol
pt has elevated temperature. 101.1
/92. Patient is asymptomatic.

## 2023-06-26 NOTE — DISCHARGE NOTE NURSING/CASE MANAGEMENT/SOCIAL WORK - PATIENT PORTAL LINK FT
You can access the FollowMyHealth Patient Portal offered by [facility] by registering at the following website: [email or web address]. By joining [facility]’s FollowMyHealth portal, you will also be able to view your health information using other applications (apps) compatible with our system.

## 2023-06-26 NOTE — DISCHARGE NOTE NURSING/CASE MANAGEMENT/SOCIAL WORK - NSDCVIVACCINE_GEN_ALL_CORE_FT
Tdap; 08-Apr-2016 08:51; Izabela Hampton ([place]); [employer]; [id number]; IntraMuscular; Deltoid Left.; 0.5 milliLiter(s); VIS (VIS Published: 09-May-2013, VIS Presented: 08-Apr-2016);

## 2023-06-26 NOTE — PROVIDER CONTACT NOTE (OTHER) - ASSESSMENT
Patient receiving IV metoprolol, prior to administering BP was 160/106. IV metoprolol was given and BP was rechecked and it was 175/100. patient asymptomatic and all other vitals stable
patient is asymptomatic. denies headache, neck pain, and chest pain.
pt asymptomatic, verbal and stating that she feels "ok"

## 2023-09-06 NOTE — ASU PATIENT PROFILE, ADULT - REASON FOR ADMISSION, PROFILE
Subjective   Patient ID: Angle Martins is a 62 y.o. female who presents for 2 Month FU (Pt in today for routine 2 month FU).    HPI   The patient is doing much better, and has been tolerating her regimens, compliant with her medications and treatment regimens, including psychoactive regimens.  Following closely with CHRONIC PAIN as well, and is being closely followed with OPIATE use.  She does not feel compelled to take her medications.  Likewise, remains physically active, and remains motivated to take care of self.  No confusion or delirium.  No headache, blurred vision, diplopia.  No dysphagia.  Following closely with PHYSICAL THERAPY, using her walking stick.  No falls.  Continues to want to stay healthy and improve her quality of life.  Does not wish harm to self or others.    Following closely with VASCULAR SURGERY, and has been released by HEMATOLOGY.  Compliant with medications, tolerating her WARFARIN.  Following closely with the [facility]ARFARIN CLINIC.  No gum or nose bleeding.  No easy bruisability.  No apparent blood in urine or stool.  No dyspepsia.  Likewise, no lightheadedness, dizziness, or any other symptoms that might be referable to hypovolemia.  No dyan substernal chest pain.  No orthopnea, no paroxysmal nocturnal dyspnea.    Contracted COVID in JULY, during which time she was evaluated for anti-COVID regimens, which she was allowed to take, and tolerated her regimens well.  No residual coughing, sputum production.  Some modest fatigue perhaps, but no change in taste or smell.  CONSTITUTIONALLY, no fever, no chills.  No night sweats.  No lingering anorexia or nausea.  No apparent lymphadenopathy.  No apparent weight loss.    Appetite preserved, with no nausea, vomiting, abdominal distress.  No diarrhea, no constipation.  No apparent blood in stool.  No apparent weight loss.  No dysuria, flank, suprapubic pain.  No discharge, no pruritus.  No rash.  No malodor.  No hesitancy, no feeling of incomplete  "voiding.  No skin changes, rashes, pruritus, jaundice.  No easy bruisability.  Has been told that she cannot be covered for her SEMAGLUTIDE.  In the meantime, has been looking into staying more physically active and eating more sensibly.  ENDOCRINE with no polyuria, polydipsia, polyphagia.  No blurred vision.  No skin, hair, nail changes.  No dramatic weight loss or weight gain.          Review of Systems  Review of systems as in history of present illness, and otherwise, reviewed separately as well, and was unremarkable/negative/noncontributory.          Objective   /73 (BP Location: Left arm, Patient Position: Sitting)   Pulse 66   Ht 1.626 m (5' 4\")   Wt 87.1 kg (192 lb)   SpO2 95%   BMI 32.96 kg/m²     Physical Exam  In very good spirits.  Not in distress or diaphoresis.  Alert, oriented x3.  Amiable.  Not unkempt.  Receptive.  Cheerful.  Appropriate.  Eager to improve quality of life, and does not wish harm to self or others.  Moderately obese, but remaining capable and independent.  Using single-point walking stick with confidence.    HEAD pink palpebral conjunctivae, anicteric sclerae.  Mucous membranes moist.  No tonsillopharyngeal congestion, no thrush.  No sinus or tragal tenderness.  NECK supple, no apparent jugular venous distention.  No lymphadenopathy.  No carotid bruit.  CARDIOVASCULAR not in distress or diaphoresis.  No bipedal edema.  Regular rate and rhythm.  No murmurs.  LUNGS not in distress or diaphoresis.  Not using accessory muscles.  Clear to auscultation bilaterally.  ABDOMEN soft, nontender.  BACK no costovertebral angle tenderness.  EXTREMITIES no clubbing, no cyanosis.  NEURO no facial asymmetry.  No apparent cranial nerve deficits.  Romberg negative.  Ambulating without need of assistance.  No apparent focal weakness.  No tremors.  PSYCH receptive, appropriate, and eager to maintain and improve quality of life.      LABORATORY results reviewed with " patient.        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)    Relevant Orders    Follow Up In Primary Care - Established    CBC and Auto Differential    Ataxic gait    Relevant Orders    Follow Up In Primary Care - Established    Vitamin B12    Folate    Azotemia    Relevant Orders    Follow Up In Primary Care - Established    CBC and Auto Differential    Urinalysis with Reflex Microscopic and Culture    Comprehensive Metabolic Panel    Creatine Kinase    Chronic pain syndrome    Relevant Orders    Follow Up In Primary Care - Established    Creatine Kinase    Class 1 obesity due to excess calories with serious comorbidity and body mass index (BMI) of 33.0 to 33.9 in adult    Relevant Orders    Follow Up In Primary Care - Established    TSH with reflex to Free T4 if abnormal    COVID-19 virus infection    Relevant Orders    Follow Up In Primary Care - Established    CBC and Auto Differential    Debility - Primary    Relevant Orders    Follow Up In Primary Care - Established    TSH with reflex to Free T4 if abnormal    Essential hypertension    Relevant Orders    Follow Up In Primary Care - Established    Urinalysis with Reflex Microscopic and Culture    TSH with reflex to Free T4 if abnormal    Hyperglycemia    Relevant Orders    Hemoglobin A1C    Major depressive disorder, recurrent episode, moderate degree (CMS/HCC)    Relevant Orders    Follow Up In Primary Care - Established    TSH with reflex to Free T4 if abnormal    Microscopic hematuria    Relevant Orders    Follow Up In Primary Care - Established    Urinalysis with Reflex Microscopic and Culture    Mixed hyperlipidemia    Relevant Orders    Follow Up In Primary Care - Established     Comprehensive Metabolic Panel    Lipid Panel    Non-traumatic rhabdomyolysis    Relevant Orders    Follow Up In Primary Care - Established    Creatine Kinase    Opioid dependence in remission (CMS/HCC)    Relevant Orders    Follow Up In Primary Care - Established    TSH with reflex to Free T4 if abnormal    Vitamin D deficiency    Relevant Medications    cholecalciferol (Vitamin D-3) 1,250 mcg (50,000 unit) capsule    Other Relevant Orders    Follow Up In Primary Care - Established        Thank you very much for coming.  I am very happy to see you.    Thank you for seeing your specialists, especially your VASCULAR SURGEON, who has been very helpful, and continues to be willing to help you.  In fact, she is also doing the job of your HEMATOLOGIST, and that is why your hematologist said that he does not need to see you at this time.    In the meantime, I can continue to check your folic acid levels, as well as other blood markers for anemia.  I will also check your urine.  I will do all of these in about 2 months with the benefit of FASTING laboratory examinations.    Please continue following with your vascular surgeon, as well as your [facility] recommendations.  We need to keep your PT/INR blood thinness THERAPEUTIC.  Thank you for taking care of yourself.  Watch out for gum or nose bleeding, or any blood in urine or stool.  Call me please if you notice anything.  Likewise, please let me know if you are suffering from new lightheadedness or palpitations or any of these symptoms that might mean that you might be having low blood count.  Watch out for acid indigestion symptoms.  Of course, if urgent, please dial 911 first.    Please continue following with your CHRONIC PAIN specialist.  I am glad that you are doing well with your current medications, especially with your history of difficulties with use of OPIATES.    I am also glad to hear that you are doing well with your PHYSICAL THERAPIST.  Please continue  using your cane.    I am very happy to continue to follow you with your challenges.  I am glad that the BUPROPION regimen is working well for you!  Please let me know if you feel more rundown than usual.    I do understand that you contracted COVID in JULY.  This means that you cannot receive the COVID vaccination for 90 days, or at least until after October.  Please continue to follow what Dr. Frazier and the CDC recommended.  Please be careful and avoid crowds, even after you have already recovered.  This does not mean that you cannot get COVID, because you could still be infected with a different/new strain.    I would recommend to wait for the latest COVID BOOSTER, and when it is out, wait at least 2 weeks and find out if other patients take it and if they do well with it.  There is no hurry, but you just have to be careful about exposure.    In the meantime, you will benefit from the latest INFLUENZA vaccination.  You only need the low-dose regimen.  I would recommend doing it September 15 or later, so that you can make sure that it lasts until mid March.  It is usually good for 6 months.    I do understand that you are no longer eligible for SEMAGLUTIDE/Ozempic/Wegovy.  Maximize diet and exercise, and we will review your options when you return in 2 months.    Please restart VITAMIN D, very important, very helpful in many ways.  I will reorder this for you.    You have had a history of muscle strain and kidney strain.  Drink lots of fluids throughout the day.  Avoid salt.    I am glad to hear that you will have your Mammogram done soon.  Please pursue this.    Please come back in 2 months.  Do some FASTING laboratory examinations via [facility], then see me soon after.  It will be time for your Medicare Wellness/welcome to Medicare visit!  Until then, please continue to take care of yourself and each other, and please continue to pray for our recovery from this pandemic.            0  Return in 2 months.   40 minutes please.  FASTING laboratory examination via [facility], then see me soon after.  Reassess hemodynamics, cardiovascular risk, coordinate with vascular surgery.  Reassess mood, energy, function, preventive strategies, follow-up with chronic pain, possibly physical therapy.  Prepare for winter.  Reassess vaccination profile, COVID status.            0  Debility reassessed.  Interval history reviewed with patient.  She is doing much better, and now is only being followed by vascular surgery, which has also been covering for cardiology and hematology.  Respectfully refusing to see neurology, stating she has no residual weakness from history of CVA/TIA.  I will continue to follow her overall cardiovascular risk.    Following closely with CHRONIC PAIN, especially with history of misuse.  Has not run out of medication earlier than expected, and understands the risks and understands she can ask for help at any time.  Respectfully refusing to see psychiatry, stating that she is doing well on BUPROPION alone, with no suicidal or homicidal ideation.    Plans as above.  Patient history of remarkable debility, but now doing much much better, and in turn, is worried about her .  Rule out caregiver stress while they try to take care of each other.            0     Abd pain

## 2024-03-19 NOTE — ED ADULT NURSE NOTE - HOW OFTEN DO YOU HAVE A DRINK CONTAINING ALCOHOL?
What Type Of Note Output Would You Prefer (Optional)?: Bullet Format How Severe Are Your Spot(S)?: mild Have Your Spot(S) Been Treated In The Past?: has not been treated Never Hpi Title: Evaluation of a Skin Lesion

## 2024-03-25 NOTE — PATIENT PROFILE ADULT - NSPROGENPREVTRANSF_GEN_A_NUR
Called Paola with Provider's message. States pt will refuse Tablets, will only take Gummies. Please advise.     Andressa Cornelius RN  [facility]     no

## 2024-10-08 NOTE — ED PROVIDER NOTE - CONSULTING PHYSICIAN
10/8/2024      Kalyn Rivero  [address] [facility] [address]      Dear Colleague,    Thank you for referring your patient, Kalyn Rivero, to the [facility] POD[facility]. Please see a copy of my visit note below.    Podiatry / Foot and Ankle Surgery Progress Note    October 8, 2024    Subject: Patient was seen for 1 week s/p right foot soft tissue mass. Here with her PCA.  Notes that overall she is doing okay but is continuing to have pain.  Would like a refill on her pain medication.  Denies fever, nausea, vomiting.    Objective:  Vitals: /72   Wt 78 kg (172 lb)   LMP  (LMP Unknown)   BMI 27.77 kg/m    BMI= Body mass index is 27.77 kg/m .    General:  Patient is alert and orientated.  NAD.    Vascular:  DP and PT pulses are palpable.  No edema or varicosities noted.  CFT's < 3secs.  Skin temp is normal.    Neuro:  Light and gross touch sensation intact to digits, dorsum, and plantar aspects of the feet.    Derm: Dressing is clean dry and intact.  Sutures are intact.  No redness, dehiscence or signs of acute infection noted.    Musculoskeletal:  No foot deformity noted.      Pathology:    Rheumatoid nodule        Assessment:    Post-operative state  Rheumatoid arthritis with positive rheumatoid factor, involving unspecified site (H)  Rheumatoid arthritis involving right foot with positive rheumatoid factor (H)    Medical Decision Making/Plan: At this time the dressing was changed and she will continue to keep the foot and dressing dry.  We did refill her medication at this time but discussed that usually once the stitches come out we do not refill the pain medication.  He will follow-up in 1 week for suture removal.      All questions were answered to patient satisfaction and she will call with questions or concerns.      Patient Risk Factor:  Patient is a low risk factor for infection.     Vashti Ayala DPM, Podiatry/Foot and Ankle Surgery      Again, thank you for allowing  me to participate in the care of your patient.        Sincerely,        Vashti Ayala DPM, Podiatry/Foot and Ankle Surgery   surg

## 2025-02-13 NOTE — H&P ADULT - NSHPLABSRESULTS_GEN_ALL_CORE
CBC Full  -  ( 16 Nov 2017 23:30 )  WBC Count : 8.39 K/uL  Hemoglobin : 14.0 g/dL  Hematocrit : 44.4 %  Platelet Count - Automated : 317 K/uL  Mean Cell Volume : 79.1 fL  Mean Cell Hemoglobin : 25.0 pg  Mean Cell Hemoglobin Concentration : 31.5 %  Auto Neutrophil # : 7.00 K/uL  Auto Lymphocyte # : 0.94 K/uL  Auto Monocyte # : 0.37 K/uL  Auto Eosinophil # : 0.02 K/uL  Auto Basophil # : 0.03 K/uL  Auto Neutrophil % : 83.4 %  Auto Lymphocyte % : 11.2 %  Auto Monocyte % : 4.4 %  Auto Eosinophil % : 0.2 %  Auto Basophil % : 0.4 %      11-16    140  |  100  |  11  ----------------------------<  117<H>  4.4   |  23  |  0.73    Ca    9.7      16 Nov 2017 23:30  Phos  2.9     11-16  Mg     2.1     11-16    TPro  9.2<H>  /  Alb  4.5  /  TBili  0.6  /  DBili  x   /  AST  20  /  ALT  13  /  AlkPhos  100  11-16 Home

## 2025-05-14 ENCOUNTER — EMERGENCY (EMERGENCY)
Facility: HOSPITAL | Age: 85
LOS: 1 days | End: 2025-05-14
Attending: STUDENT IN AN ORGANIZED HEALTH CARE EDUCATION/TRAINING PROGRAM | Admitting: STUDENT IN AN ORGANIZED HEALTH CARE EDUCATION/TRAINING PROGRAM
Payer: MEDICARE

## 2025-05-14 VITALS
HEART RATE: 83 BPM | DIASTOLIC BLOOD PRESSURE: 106 MMHG | TEMPERATURE: 98 F | RESPIRATION RATE: 18 BRPM | SYSTOLIC BLOOD PRESSURE: 179 MMHG | OXYGEN SATURATION: 99 %

## 2025-05-14 VITALS
RESPIRATION RATE: 17 BRPM | OXYGEN SATURATION: 94 % | SYSTOLIC BLOOD PRESSURE: 164 MMHG | WEIGHT: 139.99 LBS | HEART RATE: 94 BPM | TEMPERATURE: 98 F | DIASTOLIC BLOOD PRESSURE: 94 MMHG

## 2025-05-14 DIAGNOSIS — K43.5 PARASTOMAL HERNIA WITHOUT OBSTRUCTION OR GANGRENE: Chronic | ICD-10-CM

## 2025-05-14 LAB
ALBUMIN SERPL ELPH-MCNC: 4.1 G/DL — SIGNIFICANT CHANGE UP (ref 3.3–5)
ALP SERPL-CCNC: 68 U/L — SIGNIFICANT CHANGE UP (ref 40–120)
ALT FLD-CCNC: 14 U/L — SIGNIFICANT CHANGE UP (ref 4–33)
ANION GAP SERPL CALC-SCNC: 13 MMOL/L — SIGNIFICANT CHANGE UP (ref 7–14)
AST SERPL-CCNC: 21 U/L — SIGNIFICANT CHANGE UP (ref 4–32)
BILIRUB SERPL-MCNC: 0.6 MG/DL — SIGNIFICANT CHANGE UP (ref 0.2–1.2)
BUN SERPL-MCNC: 15 MG/DL — SIGNIFICANT CHANGE UP (ref 7–23)
CALCIUM SERPL-MCNC: 9.6 MG/DL — SIGNIFICANT CHANGE UP (ref 8.4–10.5)
CHLORIDE SERPL-SCNC: 100 MMOL/L — SIGNIFICANT CHANGE UP (ref 98–107)
CO2 SERPL-SCNC: 23 MMOL/L — SIGNIFICANT CHANGE UP (ref 22–31)
CREAT SERPL-MCNC: 0.63 MG/DL — SIGNIFICANT CHANGE UP (ref 0.5–1.3)
EGFR: 88 ML/MIN/1.73M2 — SIGNIFICANT CHANGE UP
EGFR: 88 ML/MIN/1.73M2 — SIGNIFICANT CHANGE UP
GLUCOSE SERPL-MCNC: 86 MG/DL — SIGNIFICANT CHANGE UP (ref 70–99)
HCT VFR BLD CALC: 30.4 % — LOW (ref 34.5–45)
HGB BLD-MCNC: 9.6 G/DL — LOW (ref 11.5–15.5)
MCHC RBC-ENTMCNC: 24 PG — LOW (ref 27–34)
MCHC RBC-ENTMCNC: 31.6 G/DL — LOW (ref 32–36)
MCV RBC AUTO: 76 FL — LOW (ref 80–100)
NRBC # BLD AUTO: 0 K/UL — SIGNIFICANT CHANGE UP (ref 0–0)
NRBC # FLD: 0 K/UL — SIGNIFICANT CHANGE UP (ref 0–0)
NRBC BLD AUTO-RTO: 0 /100 WBCS — SIGNIFICANT CHANGE UP (ref 0–0)
PLATELET # BLD AUTO: 321 K/UL — SIGNIFICANT CHANGE UP (ref 150–400)
POTASSIUM SERPL-MCNC: 4.1 MMOL/L — SIGNIFICANT CHANGE UP (ref 3.5–5.3)
POTASSIUM SERPL-SCNC: 4.1 MMOL/L — SIGNIFICANT CHANGE UP (ref 3.5–5.3)
PROT SERPL-MCNC: 8.9 G/DL — HIGH (ref 6–8.3)
RBC # BLD: 4 M/UL — SIGNIFICANT CHANGE UP (ref 3.8–5.2)
RBC # FLD: 14.4 % — SIGNIFICANT CHANGE UP (ref 10.3–14.5)
SODIUM SERPL-SCNC: 136 MMOL/L — SIGNIFICANT CHANGE UP (ref 135–145)
WBC # BLD: 4.31 K/UL — SIGNIFICANT CHANGE UP (ref 3.8–10.5)
WBC # FLD AUTO: 4.31 K/UL — SIGNIFICANT CHANGE UP (ref 3.8–10.5)

## 2025-05-14 PROCEDURE — 70450 CT HEAD/BRAIN W/O DYE: CPT | Mod: 26

## 2025-05-14 PROCEDURE — 99284 EMERGENCY DEPT VISIT MOD MDM: CPT

## 2025-05-14 RX ORDER — ACETAMINOPHEN 500 MG/5ML
975 LIQUID (ML) ORAL ONCE
Refills: 0 | Status: COMPLETED | OUTPATIENT
Start: 2025-05-14 | End: 2025-05-14

## 2025-05-14 NOTE — ED ADULT NURSE NOTE - NSFALLUNIVINTERV_ED_ALL_ED
Bed/Stretcher in lowest position, wheels locked, appropriate side rails in place/Call bell, personal items and telephone in reach/Instruct patient to call for assistance before getting out of bed/chair/stretcher/Non-slip footwear applied when patient is off stretcher/[place] to call system/Physically safe environment - no spills, clutter or unnecessary equipment/Purposeful proactive rounding/Room/bathroom lighting operational, light cord in reach

## 2025-05-14 NOTE — ED PROVIDER NOTE - PATIENT PORTAL LINK FT
You can access the FollowMyHealth Patient Portal offered by [facility] by registering at the following website: [email or web address]. By joining [employer]’s FollowMyHealth portal, you will also be able to view your health information using other applications (apps) compatible with our system.

## 2025-05-14 NOTE — ED PROVIDER NOTE - NSFOLLOWUPINSTRUCTIONS_ED_ALL_ED_FT
You were seen today for Headache, your CT head scan did not have any acute emergent problem, You were also seen for cysts on your wrists, likely those are ganglion cysts, which take time to improve and sometimes they recur. you can take the pain medicines below to help.    You can take ibuprofen 600mg every 6 hours or tylenol 1000mg every 6 hours as needed for pain.   if your symptoms change or worsen, please return to the emergency department. You were seen today for Headache, your CT head scan did not have any acute emergent problem, You were also seen for cysts on your wrists, likely those are ganglion cysts, which take time to improve and sometimes they recur. you can take the pain medicines below to help. Your hemoglobin is 9.6 please make sure to follow this up with your primary care provider.    You can take ibuprofen 600mg every 6 hours or tylenol 1000mg every 6 hours as needed for pain.   if your symptoms change or worsen, you start having black or red stools, chest pain, shortness of breath, please return to the emergency department.

## 2025-05-14 NOTE — ED PROVIDER NOTE - PROGRESS NOTE DETAILS
cth and labs unremarkable except for hgb 9.6. no melanotic or bloody stools. asymptomatic at this time. to fu with pcp strict return precautions verbalized and understood

## 2025-05-14 NOTE — ED ADULT NURSE REASSESSMENT NOTE - NS ED NURSE REASSESS COMMENT FT1
BREAK RB, Pt awake and alert, A&OX4, resp even and unlabored. Pt in no acute distress, awaiting CBC result. Denies CP, SOB, HA, dizziness, palpitations, blurry vision. Resting comfortably. Safety maintained, plan of care ongoing.

## 2025-05-14 NOTE — ED PROVIDER NOTE - ATTENDING CONTRIBUTION TO CARE
Omani/creole speaking, family translates at bedside    85 yo F hx HTN presenting for evaluation of recuring cysts to scalp that cause pain. She also reports intermittent headache for the past few months . Denies fevers/chills, visual changes, nausea/vomiting, focal weakness/numbnes/tingling. Pt also with cysts around wrists and legs. On exam, well appearing, NAD, heart, rrr, lungs ctab, head nc/at, no noted cysts to scalp, no noted lymphadenopathy. + ganglion cyst to R wrist. Motor and sensation intact. Coordination intact. Low suspicion for intracranial pathology but given duration of HA and new HA will obtain CTH, labs and reassess. Likely dc home with recs for dermatology follow up.

## 2025-05-14 NOTE — ED ADULT TRIAGE NOTE - CHIEF COMPLAINT QUOTE
Pt c/o headaches that come and go "for a while." Pt also c/o painful lumps under the skin on the scalp hands and feet. C/o "Inflammation." PHx HTN

## 2025-05-14 NOTE — ED ADULT NURSE NOTE - OBJECTIVE STATEMENT
pt a&ox4 with daughter at bedside, creole speaking. daughter translating. pt ambulatory and has a pmhx of htn. pt c/o scalp lesions that have appeared over the past few months on her scalp and hands. lesions not noticeable as they are the same color as pt skin, but can be felt on palpation. pt denies pain when lesions are palpated. pt denies being in any pain at time of assessment. pt denies shortness of breath, chest pain, abdominal pain, nausea, vomiting, at time of assessment. pt breathing with equal and bilateral chest rise. pt hypertensive to 179/106 md baptiste aware. vitals otherwise stable. abdomen soft, nontender. pt able to move all exteremities. side rails up. pt educated on call bell use

## 2025-05-14 NOTE — ED PROVIDER NOTE - CLINICAL SUMMARY MEDICAL DECISION MAKING FREE TEXT BOX
hx of htn compliant with meds pw with intermittent headaches for >2mo. alleviated with Tylenol though no aggravating factors. last took tylenol 2 days ago. no n/v neck pain/stiffness ,fevers, vision changes, photophobia or phonophobia. no weakness or numbness, no cp sob abd pain.   noted welling bumps on the occiput and the eyebrows and hands and knees that come and go sometimes are painfully feel like cysts. notes ganglion cysts run in the family. last visited PCP 2 weeks ago was told to take tylenol to help with the pain which does relieve the pain   exam: aox4 strength 5/5 throughout, no sensory defects, cn2-7 intact, s1s2 present no extra murmurs or extra heart sounds, lungs clear throughout, 1cm soft cyst to dorsum of R wrist and soft cyst to L volar wrist,     concern for intermittent long standing headaches likely tension HA though will ro mass given elderly f, soft cysts to the b/l wrists likely ganglion cysts. will give pain management no high risk fx concerning for acute cva or SAH, no infectious sxs unlikely meningitis mildly hypertensive though likely from pain

## 2025-06-20 NOTE — ED ADULT NURSE REASSESSMENT NOTE - NSFALLLASTSIX_ED_ALL_ED
No care due was identified.  [facility] Embedded Care Due Messages. Reference number: [id number].   6/20/2025 11:37:18 AM CDT   No.